# Patient Record
Sex: MALE | Race: WHITE | NOT HISPANIC OR LATINO | Employment: FULL TIME | URBAN - METROPOLITAN AREA
[De-identification: names, ages, dates, MRNs, and addresses within clinical notes are randomized per-mention and may not be internally consistent; named-entity substitution may affect disease eponyms.]

---

## 2024-05-16 ENCOUNTER — APPOINTMENT (OUTPATIENT)
Dept: LAB | Facility: CLINIC | Age: 69
End: 2024-05-16
Payer: MEDICARE

## 2024-05-16 DIAGNOSIS — D72.819 LEUKOPENIA, UNSPECIFIED TYPE: ICD-10-CM

## 2024-05-16 DIAGNOSIS — B35.1 ONYCHOMYCOSIS: ICD-10-CM

## 2024-05-16 LAB
ALBUMIN SERPL BCP-MCNC: 4.4 G/DL (ref 3.5–5)
ALP SERPL-CCNC: 68 U/L (ref 34–104)
ALT SERPL W P-5'-P-CCNC: 16 U/L (ref 7–52)
ANION GAP SERPL CALCULATED.3IONS-SCNC: 7 MMOL/L (ref 4–13)
AST SERPL W P-5'-P-CCNC: 20 U/L (ref 13–39)
BASOPHILS # BLD AUTO: 0.03 THOUSANDS/ÂΜL (ref 0–0.1)
BASOPHILS NFR BLD AUTO: 1 % (ref 0–1)
BILIRUB SERPL-MCNC: 0.53 MG/DL (ref 0.2–1)
BUN SERPL-MCNC: 14 MG/DL (ref 5–25)
CALCIUM SERPL-MCNC: 9.4 MG/DL (ref 8.4–10.2)
CHLORIDE SERPL-SCNC: 108 MMOL/L (ref 96–108)
CO2 SERPL-SCNC: 26 MMOL/L (ref 21–32)
CREAT SERPL-MCNC: 1.08 MG/DL (ref 0.6–1.3)
EOSINOPHIL # BLD AUTO: 0.3 THOUSAND/ÂΜL (ref 0–0.61)
EOSINOPHIL NFR BLD AUTO: 8 % (ref 0–6)
ERYTHROCYTE [DISTWIDTH] IN BLOOD BY AUTOMATED COUNT: 14.7 % (ref 11.6–15.1)
GFR SERPL CREATININE-BSD FRML MDRD: 69 ML/MIN/1.73SQ M
GLUCOSE SERPL-MCNC: 99 MG/DL (ref 65–140)
HCT VFR BLD AUTO: 41.6 % (ref 36.5–49.3)
HGB BLD-MCNC: 12.9 G/DL (ref 12–17)
IMM GRANULOCYTES # BLD AUTO: 0.01 THOUSAND/UL (ref 0–0.2)
IMM GRANULOCYTES NFR BLD AUTO: 0 % (ref 0–2)
LYMPHOCYTES # BLD AUTO: 1 THOUSANDS/ÂΜL (ref 0.6–4.47)
LYMPHOCYTES NFR BLD AUTO: 25 % (ref 14–44)
MCH RBC QN AUTO: 25.1 PG (ref 26.8–34.3)
MCHC RBC AUTO-ENTMCNC: 31 G/DL (ref 31.4–37.4)
MCV RBC AUTO: 81 FL (ref 82–98)
MONOCYTES # BLD AUTO: 0.45 THOUSAND/ÂΜL (ref 0.17–1.22)
MONOCYTES NFR BLD AUTO: 11 % (ref 4–12)
NEUTROPHILS # BLD AUTO: 2.19 THOUSANDS/ÂΜL (ref 1.85–7.62)
NEUTS SEG NFR BLD AUTO: 55 % (ref 43–75)
NRBC BLD AUTO-RTO: 0 /100 WBCS
PLATELET # BLD AUTO: 217 THOUSANDS/UL (ref 149–390)
PMV BLD AUTO: 9 FL (ref 8.9–12.7)
POTASSIUM SERPL-SCNC: 4.3 MMOL/L (ref 3.5–5.3)
PROT SERPL-MCNC: 7.5 G/DL (ref 6.4–8.4)
RBC # BLD AUTO: 5.14 MILLION/UL (ref 3.88–5.62)
SODIUM SERPL-SCNC: 141 MMOL/L (ref 135–147)
WBC # BLD AUTO: 3.98 THOUSAND/UL (ref 4.31–10.16)

## 2024-05-16 PROCEDURE — 85025 COMPLETE CBC W/AUTO DIFF WBC: CPT

## 2024-05-16 PROCEDURE — 36415 COLL VENOUS BLD VENIPUNCTURE: CPT

## 2024-05-16 PROCEDURE — 80053 COMPREHEN METABOLIC PANEL: CPT

## 2024-07-09 ENCOUNTER — PATIENT MESSAGE (OUTPATIENT)
Dept: FAMILY MEDICINE CLINIC | Facility: CLINIC | Age: 69
End: 2024-07-09

## 2024-07-09 DIAGNOSIS — B35.1 ONYCHOMYCOSIS: ICD-10-CM

## 2024-07-11 RX ORDER — TERBINAFINE HYDROCHLORIDE 250 MG/1
250 TABLET ORAL DAILY
Qty: 90 TABLET | Refills: 0 | OUTPATIENT
Start: 2024-07-11

## 2024-08-28 ENCOUNTER — TELEPHONE (OUTPATIENT)
Dept: ADMINISTRATIVE | Facility: OTHER | Age: 69
End: 2024-08-28

## 2024-08-28 ENCOUNTER — RA CDI HCC (OUTPATIENT)
Dept: OTHER | Facility: HOSPITAL | Age: 69
End: 2024-08-28

## 2024-08-30 ENCOUNTER — APPOINTMENT (OUTPATIENT)
Dept: LAB | Facility: CLINIC | Age: 69
End: 2024-08-30
Payer: MEDICARE

## 2024-08-30 DIAGNOSIS — D72.819 LEUKOPENIA, UNSPECIFIED TYPE: ICD-10-CM

## 2024-08-30 DIAGNOSIS — R79.89 ELEVATED TSH: ICD-10-CM

## 2024-08-30 DIAGNOSIS — I77.810 THORACIC AORTIC ECTASIA (HCC): ICD-10-CM

## 2024-08-30 DIAGNOSIS — I10 PRIMARY HYPERTENSION: ICD-10-CM

## 2024-08-30 DIAGNOSIS — R73.09 ABNORMAL BLOOD SUGAR: ICD-10-CM

## 2024-08-30 DIAGNOSIS — R93.1 ELEVATED CORONARY ARTERY CALCIUM SCORE: ICD-10-CM

## 2024-08-30 DIAGNOSIS — I65.23 BILATERAL CAROTID ARTERY STENOSIS: ICD-10-CM

## 2024-08-30 DIAGNOSIS — E55.9 VITAMIN D DEFICIENCY: ICD-10-CM

## 2024-08-30 DIAGNOSIS — R80.1 PERSISTENT PROTEINURIA: ICD-10-CM

## 2024-08-30 LAB
25(OH)D3 SERPL-MCNC: 31.6 NG/ML (ref 30–100)
ALBUMIN SERPL BCG-MCNC: 4.4 G/DL (ref 3.5–5)
ALP SERPL-CCNC: 69 U/L (ref 34–104)
ALT SERPL W P-5'-P-CCNC: 14 U/L (ref 7–52)
ANION GAP SERPL CALCULATED.3IONS-SCNC: 5 MMOL/L (ref 4–13)
AST SERPL W P-5'-P-CCNC: 18 U/L (ref 13–39)
BACTERIA UR QL AUTO: ABNORMAL /HPF
BASOPHILS # BLD AUTO: 0.02 THOUSANDS/ÂΜL (ref 0–0.1)
BASOPHILS NFR BLD AUTO: 1 % (ref 0–1)
BILIRUB SERPL-MCNC: 0.45 MG/DL (ref 0.2–1)
BILIRUB UR QL STRIP: NEGATIVE
BUN SERPL-MCNC: 13 MG/DL (ref 5–25)
CALCIUM SERPL-MCNC: 9.4 MG/DL (ref 8.4–10.2)
CHLORIDE SERPL-SCNC: 109 MMOL/L (ref 96–108)
CHOLEST SERPL-MCNC: 151 MG/DL
CLARITY UR: CLEAR
CO2 SERPL-SCNC: 29 MMOL/L (ref 21–32)
COLOR UR: YELLOW
CREAT SERPL-MCNC: 1.11 MG/DL (ref 0.6–1.3)
EOSINOPHIL # BLD AUTO: 0.36 THOUSAND/ÂΜL (ref 0–0.61)
EOSINOPHIL NFR BLD AUTO: 8 % (ref 0–6)
ERYTHROCYTE [DISTWIDTH] IN BLOOD BY AUTOMATED COUNT: 15.7 % (ref 11.6–15.1)
EST. AVERAGE GLUCOSE BLD GHB EST-MCNC: 117 MG/DL
GFR SERPL CREATININE-BSD FRML MDRD: 66 ML/MIN/1.73SQ M
GLUCOSE P FAST SERPL-MCNC: 102 MG/DL (ref 65–99)
GLUCOSE UR STRIP-MCNC: NEGATIVE MG/DL
HBA1C MFR BLD: 5.7 %
HCT VFR BLD AUTO: 43.4 % (ref 36.5–49.3)
HDLC SERPL-MCNC: 54 MG/DL
HGB BLD-MCNC: 13.9 G/DL (ref 12–17)
HGB UR QL STRIP.AUTO: NEGATIVE
IMM GRANULOCYTES # BLD AUTO: 0.01 THOUSAND/UL (ref 0–0.2)
IMM GRANULOCYTES NFR BLD AUTO: 0 % (ref 0–2)
KETONES UR STRIP-MCNC: NEGATIVE MG/DL
LDLC SERPL CALC-MCNC: 88 MG/DL (ref 0–100)
LEUKOCYTE ESTERASE UR QL STRIP: ABNORMAL
LYMPHOCYTES # BLD AUTO: 1.18 THOUSANDS/ÂΜL (ref 0.6–4.47)
LYMPHOCYTES NFR BLD AUTO: 27 % (ref 14–44)
MCH RBC QN AUTO: 26.1 PG (ref 26.8–34.3)
MCHC RBC AUTO-ENTMCNC: 32 G/DL (ref 31.4–37.4)
MCV RBC AUTO: 82 FL (ref 82–98)
MONOCYTES # BLD AUTO: 0.45 THOUSAND/ÂΜL (ref 0.17–1.22)
MONOCYTES NFR BLD AUTO: 10 % (ref 4–12)
NEUTROPHILS # BLD AUTO: 2.41 THOUSANDS/ÂΜL (ref 1.85–7.62)
NEUTS SEG NFR BLD AUTO: 54 % (ref 43–75)
NITRITE UR QL STRIP: NEGATIVE
NON-SQ EPI CELLS URNS QL MICRO: ABNORMAL /HPF
NRBC BLD AUTO-RTO: 0 /100 WBCS
PH UR STRIP.AUTO: 6.5 [PH]
PLATELET # BLD AUTO: 218 THOUSANDS/UL (ref 149–390)
PMV BLD AUTO: 9.5 FL (ref 8.9–12.7)
POTASSIUM SERPL-SCNC: 4.8 MMOL/L (ref 3.5–5.3)
PROT SERPL-MCNC: 7.2 G/DL (ref 6.4–8.4)
PROT UR STRIP-MCNC: ABNORMAL MG/DL
RBC # BLD AUTO: 5.32 MILLION/UL (ref 3.88–5.62)
RBC #/AREA URNS AUTO: ABNORMAL /HPF
SODIUM SERPL-SCNC: 143 MMOL/L (ref 135–147)
SP GR UR STRIP.AUTO: 1.02 (ref 1–1.03)
T4 FREE SERPL-MCNC: 0.82 NG/DL (ref 0.61–1.12)
TRIGL SERPL-MCNC: 43 MG/DL
TSH SERPL DL<=0.05 MIU/L-ACNC: 6.04 UIU/ML (ref 0.45–4.5)
UROBILINOGEN UR STRIP-ACNC: <2 MG/DL
WBC # BLD AUTO: 4.43 THOUSAND/UL (ref 4.31–10.16)
WBC #/AREA URNS AUTO: ABNORMAL /HPF

## 2024-08-30 PROCEDURE — 81001 URINALYSIS AUTO W/SCOPE: CPT

## 2024-08-30 PROCEDURE — 84439 ASSAY OF FREE THYROXINE: CPT

## 2024-08-30 PROCEDURE — 85025 COMPLETE CBC W/AUTO DIFF WBC: CPT

## 2024-08-30 PROCEDURE — 82306 VITAMIN D 25 HYDROXY: CPT

## 2024-08-30 PROCEDURE — 36415 COLL VENOUS BLD VENIPUNCTURE: CPT

## 2024-08-30 PROCEDURE — 83036 HEMOGLOBIN GLYCOSYLATED A1C: CPT

## 2024-08-30 PROCEDURE — 80053 COMPREHEN METABOLIC PANEL: CPT

## 2024-08-30 PROCEDURE — 80061 LIPID PANEL: CPT

## 2024-08-30 PROCEDURE — 84443 ASSAY THYROID STIM HORMONE: CPT

## 2024-09-04 ENCOUNTER — OFFICE VISIT (OUTPATIENT)
Dept: FAMILY MEDICINE CLINIC | Facility: CLINIC | Age: 69
End: 2024-09-04
Payer: MEDICARE

## 2024-09-04 VITALS
TEMPERATURE: 98.3 F | HEART RATE: 85 BPM | OXYGEN SATURATION: 96 % | DIASTOLIC BLOOD PRESSURE: 94 MMHG | HEIGHT: 69 IN | WEIGHT: 204.6 LBS | BODY MASS INDEX: 30.3 KG/M2 | SYSTOLIC BLOOD PRESSURE: 132 MMHG | RESPIRATION RATE: 16 BRPM

## 2024-09-04 DIAGNOSIS — B35.1 ONYCHOMYCOSIS: ICD-10-CM

## 2024-09-04 DIAGNOSIS — Z80.0 FAMILY HISTORY OF COLON CANCER: ICD-10-CM

## 2024-09-04 DIAGNOSIS — I10 PRIMARY HYPERTENSION: Primary | ICD-10-CM

## 2024-09-04 DIAGNOSIS — R91.1 LUNG NODULE: ICD-10-CM

## 2024-09-04 DIAGNOSIS — E03.8 SUBCLINICAL HYPOTHYROIDISM: ICD-10-CM

## 2024-09-04 DIAGNOSIS — I77.810 THORACIC AORTIC ECTASIA (HCC): ICD-10-CM

## 2024-09-04 PROCEDURE — 99214 OFFICE O/P EST MOD 30 MIN: CPT | Performed by: FAMILY MEDICINE

## 2024-09-04 PROCEDURE — G2211 COMPLEX E/M VISIT ADD ON: HCPCS | Performed by: FAMILY MEDICINE

## 2024-09-04 RX ORDER — MULTIVITAMIN
1 TABLET ORAL DAILY
COMMUNITY

## 2024-09-04 NOTE — PROGRESS NOTES
"  *** refresh    Assessment/Plan:       Assessment & Plan  Primary hypertension         Subclinical hypothyroidism                  Most recent labs reviewed ***      Subjective:     Herbert is a 69 y.o. male here today and has the below chronic conditions:    Patient Active Problem List   Diagnosis    Primary hypertension    Onychomycosis    History of gout    Family history of colon cancer    Abnormal blood sugar    Vitamin D deficiency    Bilateral carotid artery stenosis    Thoracic aortic ectasia (HCC)    Scoliosis of thoracolumbar spine    Elevated coronary artery calcium score    Elevated TSH    Leukopenia    Skin lesion of right lower extremity    Persistent proteinuria    Lung nodule     Current Outpatient Medications   Medication Sig Dispense Refill    allopurinol (ZYLOPRIM) 100 mg tablet TAKE 1 TABLET BY MOUTH EVERY DAY 90 tablet 1    Fish Oil-Cholecalciferol (Omega-3 + D) 500-200 MG-UNIT CAPS Take by mouth Res-q 1250      Multiple Vitamin (multivitamin) tablet Take 1 tablet by mouth daily      ramipril (ALTACE) 5 mg capsule TAKE 1 CAPSULE BY MOUTH EVERY DAY 90 capsule 1    Red Yeast Rice Extract (RED YEAST RICE PO) Take by mouth One daily       No current facility-administered medications for this visit.          HPI:  Chief Complaint   Patient presents with    Follow-up     6 mo f/u. No new problems or concerns at this time.      - CC above per clinical staff and reviewed.    Pt here for     BP at home 123/77, no high readings at home.   No CP or SOB          The following portions of the patient's history were reviewed and updated as appropriate: allergies, current medications, past family history, past medical history, past social history, past surgical history and problem list.    ROS:  Review of Systems   As above    Objective:      /94   Pulse 85   Temp 98.3 °F (36.8 °C)   Resp 16   Ht 5' 9\" (1.753 m)   Wt 92.8 kg (204 lb 9.6 oz)   SpO2 96%   BMI 30.21 kg/m²   BP Readings from Last 3 " Encounters:   09/04/24 132/94   03/28/24 128/72   03/28/24 128/72     Wt Readings from Last 3 Encounters:   09/04/24 92.8 kg (204 lb 9.6 oz)   03/28/24 91.6 kg (202 lb)   03/28/24 91.6 kg (202 lb)               Physical Exam:   Physical Exam

## 2024-09-05 NOTE — ASSESSMENT & PLAN NOTE
He has completed oral terbinafine w resolution of the onychomycosis, though did have side effects of myalgias, fatigue.  He is gradually improving now that he is no longer taking the medication.

## 2024-09-05 NOTE — PROGRESS NOTES
Assessment/Plan:       Assessment & Plan  Primary hypertension  Mild elevation in office, but pt notes his BP is consistently well controlled at home.        Subclinical hypothyroidism  TSH remains in subclinical range   Given age and TSH level- does not require supplemental thyroid hormone but will continue to monitor q 6 months       Family history of colon cancer  Up to date on colonoscopy        Onychomycosis  He has completed oral terbinafine w resolution of the onychomycosis, though did have side effects of myalgias, fatigue. He is gradually improving now that he is no longer taking the medication.        Thoracic aortic ectasia (HCC)  Size remains stable on last imaging  He will have f/u at his next [facility] physical  He also is continuing to follow up with cardiology.       Lung nodule  Reviewed the CT scan from his [facility] physical demonstrating 0.4 cm RLL pulm nodule.  He is low risk aside from age.  He'll get this repeated during his [facility] PE in the spring.  He declines order from me at this point and feels comfortable getting this done when at [facility].  No smoking or vaping hx.                  Subjective:     Herbert is a 69 y.o. male here today and has the below chronic conditions:    Patient Active Problem List   Diagnosis    Primary hypertension    Onychomycosis    History of gout    Family history of colon cancer    Abnormal blood sugar    Vitamin D deficiency    Bilateral carotid artery stenosis    Thoracic aortic ectasia (HCC)    Scoliosis of thoracolumbar spine    Elevated coronary artery calcium score    Leukopenia    Skin lesion of right lower extremity    Persistent proteinuria    Lung nodule    Subclinical hypothyroidism     Current Outpatient Medications   Medication Sig Dispense Refill    allopurinol (ZYLOPRIM) 100 mg tablet TAKE 1 TABLET BY MOUTH EVERY DAY 90 tablet 1    Fish Oil-Cholecalciferol (Omega-3 + D) 500-200 MG-UNIT CAPS Take by mouth Res-q 1250      Multiple  "Vitamin (multivitamin) tablet Take 1 tablet by mouth daily      ramipril (ALTACE) 5 mg capsule TAKE 1 CAPSULE BY MOUTH EVERY DAY 90 capsule 1    Red Yeast Rice Extract (RED YEAST RICE PO) Take by mouth One daily       No current facility-administered medications for this visit.          HPI:  Chief Complaint   Patient presents with    Follow-up     6 mo f/u. No new problems or concerns at this time.      - CC above per clinical staff and reviewed.        The following portions of the patient's history were reviewed and updated as appropriate: allergies, current medications, past family history, past medical history, past social history, past surgical history and problem list.    ROS:  Review of Systems   As above    Objective:      /94   Pulse 85   Temp 98.3 °F (36.8 °C)   Resp 16   Ht 5' 9\" (1.753 m)   Wt 92.8 kg (204 lb 9.6 oz)   SpO2 96%   BMI 30.21 kg/m²   BP Readings from Last 3 Encounters:   09/04/24 132/94   03/28/24 128/72   03/28/24 128/72     Wt Readings from Last 3 Encounters:   09/04/24 92.8 kg (204 lb 9.6 oz)   03/28/24 91.6 kg (202 lb)   03/28/24 91.6 kg (202 lb)               Physical Exam:   Physical Exam  Vitals and nursing note reviewed.   Constitutional:       General: He is not in acute distress.     Appearance: Normal appearance. He is well-developed. He is not ill-appearing.   HENT:      Head: Normocephalic and atraumatic.      Mouth/Throat:      Mouth: Mucous membranes are moist.   Eyes:      Conjunctiva/sclera: Conjunctivae normal.   Neck:      Vascular: No carotid bruit.   Cardiovascular:      Rate and Rhythm: Normal rate and regular rhythm.      Heart sounds: Normal heart sounds. No murmur heard.  Pulmonary:      Effort: Pulmonary effort is normal. No respiratory distress.      Breath sounds: Normal breath sounds. No wheezing.   Abdominal:      Palpations: Abdomen is soft.      Tenderness: There is no abdominal tenderness. There is no guarding or rebound.   Musculoskeletal:     "  Cervical back: Neck supple.      Right lower leg: No edema.      Left lower leg: No edema.   Lymphadenopathy:      Cervical: No cervical adenopathy.   Skin:     General: Skin is warm and dry.   Neurological:      Mental Status: He is alert and oriented to person, place, and time.      Gait: Gait normal.   Psychiatric:         Mood and Affect: Mood normal.         Behavior: Behavior normal.

## 2024-09-05 NOTE — ASSESSMENT & PLAN NOTE
Reviewed the CT scan from his [facility] physical demonstrating 0.4 cm RLL pulm nodule.  He is low risk aside from age.  He'll get this repeated during his [facility] PE in the spring.  He declines order from me at this point and feels comfortable getting this done when at [facility].  No smoking or vaping hx.

## 2024-09-05 NOTE — ASSESSMENT & PLAN NOTE
TSH remains in subclinical range   Given age and TSH level- does not require supplemental thyroid hormone but will continue to monitor q 6 months

## 2024-09-05 NOTE — CMS CERTIFICATION NOTE
Assessment/Plan:       Assessment & Plan  Primary hypertension  Mild elevation in office, but pt notes his BP is consistently well controlled at home.       Subclinical hypothyroidism  TSH remains in subclinical range   Given age and TSH level- does not require supplemental thyroid hormone but will continue to monitor q 6 months         Family history of colon cancer  Up to date on colonoscopy         Onychomycosis  He has completed oral terbinafine w resolution of the onychomycosis, though did have side effects of myalgias, fatigue.  He is gradually improving now that he is no longer taking the medication.           Thoracic aortic ectasia (HCC)  Size remains stable on last imaging  He will have f/u at his next [facility]  He also is continuing to follow up with cardiology.         Lung nodule  Reviewed the CT scan from his [facility] physical demonstrating 0.4 cm RLL pulm nodule.  He is low risk aside from age.  He'll get this repeated during his [facility] PE in the spring.  He declines order from me at this point and feels comfortable getting this done when at [facility].  No smoking or vaping hx.                  Most recent labs reviewed       Subjective:     Herbert is a 69 y.o. male here today and has the below chronic conditions:    Patient Active Problem List   Diagnosis    Primary hypertension    Onychomycosis    History of gout    Family history of colon cancer    Abnormal blood sugar    Vitamin D deficiency    Bilateral carotid artery stenosis    Thoracic aortic ectasia (HCC)    Scoliosis of thoracolumbar spine    Elevated coronary artery calcium score    Leukopenia    Skin lesion of right lower extremity    Persistent proteinuria    Lung nodule    Subclinical hypothyroidism     Current Outpatient Medications   Medication Sig Dispense Refill    allopurinol (ZYLOPRIM) 100 mg tablet TAKE 1 TABLET BY MOUTH EVERY DAY 90 tablet 1    Fish Oil-Cholecalciferol (Omega-3 + D) 500-200 MG-UNIT CAPS  "Take by mouth Res-q 1250      Multiple Vitamin (multivitamin) tablet Take 1 tablet by mouth daily      ramipril (ALTACE) 5 mg capsule TAKE 1 CAPSULE BY MOUTH EVERY DAY 90 capsule 1    Red Yeast Rice Extract (RED YEAST RICE PO) Take by mouth One daily       No current facility-administered medications for this visit.          HPI:  Chief Complaint   Patient presents with    Follow-up     6 mo f/u. No new problems or concerns at this time.      - CC above per clinical staff and reviewed.    Pt here for six month follow up visit.   He is followed [facility].  No concerns today.     Notes that he was on oral antifungal for 12 weeks for onychomycosis.  He had an area on the toe/foot that derm was considering biopsying, but this cleared as fungal changes cleared.   Had side effects while on med including fatigue, lack of exercise endurance, aches.  He notes that he is slowly improving since he completed treatment-  not quite back to baseline yet but improving.    He typically exercises regularly.  His strength/endurance decreased while on antifungal, but he is slowly ramping up his exercise- about at 80% of his usual now.    Not having any CP or SOB with activity.    No changes in his bowel habits.  No blood in stool.  Utd on colonoscopy    He monitors home BP readings. These have been consistently well controlled.    Managing stresses well  Enjoying spending time with 6 month old granddaughter        The following portions of the patient's history were reviewed and updated as appropriate: allergies, current medications, past family history, past medical history, past social history, past surgical history and problem list.    ROS:  Review of Systems   As above    Objective:      /94   Pulse 85   Temp 98.3 °F (36.8 °C)   Resp 16   Ht 5' 9\" (1.753 m)   Wt 92.8 kg (204 lb 9.6 oz)   SpO2 96%   BMI 30.21 kg/m²   BP Readings from Last 3 Encounters:   09/04/24 132/94   03/28/24 128/72   03/28/24 " 128/72     Wt Readings from Last 3 Encounters:   09/04/24 92.8 kg (204 lb 9.6 oz)   03/28/24 91.6 kg (202 lb)   03/28/24 91.6 kg (202 lb)               Physical Exam:   Physical Exam  Vitals and nursing note reviewed.   Constitutional:       General: He is not in acute distress.     Appearance: Normal appearance. He is well-developed. He is not ill-appearing.   HENT:      Head: Normocephalic and atraumatic.   Eyes:      Conjunctiva/sclera: Conjunctivae normal.   Neck:      Vascular: No carotid bruit.   Cardiovascular:      Rate and Rhythm: Normal rate and regular rhythm.      Heart sounds: Normal heart sounds. No murmur heard.  Pulmonary:      Effort: Pulmonary effort is normal. No respiratory distress.      Breath sounds: Normal breath sounds. No wheezing.   Abdominal:      Palpations: Abdomen is soft.      Tenderness: There is no abdominal tenderness. There is no guarding or rebound.   Musculoskeletal:      Cervical back: Neck supple.      Right lower leg: No edema.      Left lower leg: No edema.   Lymphadenopathy:      Cervical: No cervical adenopathy.   Skin:     General: Skin is warm and dry.   Neurological:      Mental Status: He is alert and oriented to person, place, and time.      Gait: Gait normal.   Psychiatric:         Mood and Affect: Mood normal.         Behavior: Behavior normal.

## 2024-09-05 NOTE — ASSESSMENT & PLAN NOTE
Size remains stable on last imaging  He will have f/u at his next [facility]  He also is continuing to follow up with cardiology.

## 2024-09-27 DIAGNOSIS — I10 PRIMARY HYPERTENSION: ICD-10-CM

## 2024-09-27 DIAGNOSIS — Z87.39 HISTORY OF GOUT: ICD-10-CM

## 2024-09-27 RX ORDER — ALLOPURINOL 100 MG/1
TABLET ORAL DAILY
Qty: 90 TABLET | Refills: 1 | Status: SHIPPED | OUTPATIENT
Start: 2024-09-27

## 2024-09-27 RX ORDER — RAMIPRIL 5 MG/1
CAPSULE ORAL DAILY
Qty: 90 CAPSULE | Refills: 1 | Status: SHIPPED | OUTPATIENT
Start: 2024-09-27

## 2024-11-22 ENCOUNTER — PATIENT MESSAGE (OUTPATIENT)
Dept: FAMILY MEDICINE CLINIC | Facility: CLINIC | Age: 69
End: 2024-11-22

## 2024-11-22 DIAGNOSIS — M41.25 OTHER IDIOPATHIC SCOLIOSIS, THORACOLUMBAR REGION: Primary | ICD-10-CM

## 2024-12-13 ENCOUNTER — APPOINTMENT (OUTPATIENT)
Dept: RADIOLOGY | Facility: AMBULARY SURGERY CENTER | Age: 69
End: 2024-12-13
Payer: MEDICARE

## 2024-12-13 VITALS
HEART RATE: 78 BPM | DIASTOLIC BLOOD PRESSURE: 98 MMHG | SYSTOLIC BLOOD PRESSURE: 167 MMHG | BODY MASS INDEX: 30.21 KG/M2 | WEIGHT: 204 LBS | HEIGHT: 69 IN

## 2024-12-13 DIAGNOSIS — M41.25 OTHER IDIOPATHIC SCOLIOSIS, THORACOLUMBAR REGION: ICD-10-CM

## 2024-12-13 DIAGNOSIS — M53.3 SACROILIAC JOINT DYSFUNCTION OF LEFT SIDE: Primary | ICD-10-CM

## 2024-12-13 PROCEDURE — 72080 X-RAY EXAM THORACOLMB 2/> VW: CPT

## 2024-12-13 PROCEDURE — 99204 OFFICE O/P NEW MOD 45 MIN: CPT | Performed by: PHYSICAL MEDICINE & REHABILITATION

## 2024-12-13 NOTE — PROGRESS NOTES
1. Sacroiliac joint dysfunction of left side        2. Other idiopathic scoliosis, thoracolumbar region  Ambulatory Referral to Orthopedic Surgery    XR spine thoracolumbar 2 vw        Orders Placed This Encounter   Procedures    XR spine thoracolumbar 2 vw        Impression:  Lumbar pain that is multifactorial and predominantly due to left sacroiliac joint dysfunction likely from spinal curvature.  There are no concerning neurological deficits.    Patient reports minimal discomfort at time that lasts for a few seconds and resolves.  We discussed that this curvature has likely been present for decades.  We discussed that the best treatment for this is continued physical activity/mobility.  We also discussed that bracing is not a great treatment plan as it can lead to disuse atrophy/worsening of his symptoms.    If Gene's symptoms were to worsen any, I would like to see him back.    No follow-ups on file.    Patient is in agreement with the above plan.      Imaging Studies (I personally reviewed images in PACS and report if it was available):  Lumbar spine x-rays that are most recent to this encounter were reviewed.  These images show a moderate S-shaped curvature of the spine.  There are degenerative changes in the spine and interpretation of disc space is obscured due to the curvature.    HPI:  Herbert Valdivia is a 69 y.o. male  who presents for evaluation of   Chief Complaint   Patient presents with    Lower Back - Pain       Onset/Mechanism: Started 6 months ago without an injury.  Location: Left low back.  Radiation: Denies.  Quality: Painful.  Provocative: Random.  Severity: Not severe.  It is intermittent.  Associated Symptoms: Denies.  Treatment so far: Chiropractic care.    No red flag symptoms including fever/chills, unintentional weight change, bowel/bladder incontinence, scissoring gait, personal/family history of cancer, pain worse at night, intravenous drug abuse or trauma.      Following history  "reviewed and updated:  Past Medical History:   Diagnosis Date    Gout     Hypertension     Kidney stone     Scoliosis April 2022    Chiropractor     Past Surgical History:   Procedure Laterality Date    COLONOSCOPY  06/2015    diverticulosis only    COLONOSCOPY  03/2010    GERARD Caldera MD.- Diverticulosis.    COLONOSCOPY W/ BIOPSIES  12/2020    GERARD Maynard MD.- One 2mm polyp at recto-sigmoid colon; diverticulosis in sigmoid colon and distal descending colon. Bx: Hyperplastic polyp, microvesicular type; negative for dysplasia.    COLONOSCOPY W/ BIOPSIES  06/2004    GERARD Caldera MD.- Small left colon polyp, benign in appearance. Bx: hyperplastic polyp.    ROTATOR CUFF REPAIR Right 2007     Social History   Social History     Substance and Sexual Activity   Alcohol Use Yes    Alcohol/week: 3.0 standard drinks of alcohol    Types: 1 Glasses of wine, 1 Cans of beer, 1 Standard drinks or equivalent per week    Comment: Social     Social History     Substance and Sexual Activity   Drug Use Never     Social History     Tobacco Use   Smoking Status Never    Passive exposure: Past   Smokeless Tobacco Never     Family History   Problem Relation Age of Onset    Colon cancer Mother 78    Heart disease Father         cabg x 4 in his 50s, smoker    No Known Problems Sister     Skin cancer Brother         coretta lawrence age 80s    No Known Problems Daughter      No Known Allergies     Constitutional:  /98   Pulse 78   Ht 5' 9\" (1.753 m)   Wt 92.5 kg (204 lb)   BMI 30.13 kg/m²    General: NAD.   Eyes: Anicteric sclerae.  Neck: Supple.  Lungs: Unlabored breathing.  Cardiovascular: No lower extremity edema.  Skin: Intact without erythema.  Neurologic: Sensation intact to light touch.  Psychiatric: Mood and affect are appropriate.    Orthopedic Examination  Inspection: S-shaped curvature in the spine.  No obvious deformities, lesions or rashes.  ROM: Within normal limits.  Palpation: Mildly tender to palpation at the " left sacroiliac joint. There are no step offs.  Neuro: Bilateral extremity strength is normal and symmetric. No muscle atrophy or abnormal tone.  Bilateral extremity muscle stretch reflexes are physiologic and symmetric .  No myelopathic signs.   Sensation to light touch is intact throughout.  Neural compression testing: Normal bilateral SLR/dural stretch.  Normal Childs's maneuver.    Gait is normal.    Procedures

## 2025-02-25 DIAGNOSIS — Z00.00 ENCOUNTER FOR PREVENTIVE HEALTH EXAMINATION: Primary | ICD-10-CM

## 2025-03-12 ENCOUNTER — PREP FOR PROCEDURE (OUTPATIENT)
Dept: CARDIOLOGY CLINIC | Facility: CLINIC | Age: 70
End: 2025-03-12

## 2025-03-12 ENCOUNTER — OFFICE VISIT (OUTPATIENT)
Dept: FAMILY MEDICINE CLINIC | Facility: CLINIC | Age: 70
End: 2025-03-12

## 2025-03-12 ENCOUNTER — HOSPITAL ENCOUNTER (OUTPATIENT)
Dept: NON INVASIVE DIAGNOSTICS | Facility: CLINIC | Age: 70
Discharge: HOME | End: 2025-03-12

## 2025-03-12 ENCOUNTER — LAB (OUTPATIENT)
Dept: LAB | Facility: CLINIC | Age: 70
End: 2025-03-12

## 2025-03-12 ENCOUNTER — TELEPHONE (OUTPATIENT)
Dept: CARDIOLOGY CLINIC | Facility: CLINIC | Age: 70
End: 2025-03-12

## 2025-03-12 VITALS
WEIGHT: 202 LBS | SYSTOLIC BLOOD PRESSURE: 128 MMHG | HEIGHT: 67 IN | HEART RATE: 62 BPM | BODY MASS INDEX: 31.71 KG/M2 | DIASTOLIC BLOOD PRESSURE: 78 MMHG

## 2025-03-12 VITALS
SYSTOLIC BLOOD PRESSURE: 128 MMHG | DIASTOLIC BLOOD PRESSURE: 78 MMHG | HEIGHT: 67 IN | BODY MASS INDEX: 31.83 KG/M2 | TEMPERATURE: 97.9 F | WEIGHT: 202.8 LBS | HEART RATE: 62 BPM | OXYGEN SATURATION: 97 %

## 2025-03-12 DIAGNOSIS — R94.39 POSITIVE CARDIAC STRESS TEST: ICD-10-CM

## 2025-03-12 DIAGNOSIS — E03.8 SUBCLINICAL HYPOTHYROIDISM: ICD-10-CM

## 2025-03-12 DIAGNOSIS — Z00.00 WELL ADULT EXAM: Primary | ICD-10-CM

## 2025-03-12 DIAGNOSIS — R94.39 ABNORMAL CARDIOVASCULAR STRESS TEST: Primary | ICD-10-CM

## 2025-03-12 DIAGNOSIS — I10 PRIMARY HYPERTENSION: ICD-10-CM

## 2025-03-12 DIAGNOSIS — Z00.00 ENCOUNTER FOR PREVENTIVE HEALTH EXAMINATION: ICD-10-CM

## 2025-03-12 DIAGNOSIS — R93.1 ELEVATED CORONARY ARTERY CALCIUM SCORE: ICD-10-CM

## 2025-03-12 DIAGNOSIS — Z87.39 HISTORY OF GOUT: ICD-10-CM

## 2025-03-12 DIAGNOSIS — R91.1 LUNG NODULE: ICD-10-CM

## 2025-03-12 DIAGNOSIS — I77.810 THORACIC AORTIC ECTASIA (HCC): ICD-10-CM

## 2025-03-12 DIAGNOSIS — R73.03 PREDIABETES: ICD-10-CM

## 2025-03-12 LAB
25(OH)D3 SERPL-MCNC: 31.5 NG/ML (ref 30–100)
ALBUMIN SERPL BCG-MCNC: 4.7 G/DL (ref 3.5–5)
ALP SERPL-CCNC: 77 U/L (ref 34–104)
ALT SERPL W P-5'-P-CCNC: 18 U/L (ref 7–52)
ANION GAP SERPL CALCULATED.3IONS-SCNC: 6 MMOL/L (ref 4–13)
AORTIC ROOT: 3.4 CM
ASCENDING AORTA: 4.1 CM
AST SERPL W P-5'-P-CCNC: 23 U/L (ref 13–39)
ATRIAL RATE: 60 BPM
BACTERIA UR QL AUTO: ABNORMAL /HPF
BASOPHILS # BLD AUTO: 0.04 THOUSANDS/ÂΜL (ref 0–0.1)
BASOPHILS NFR BLD AUTO: 1 % (ref 0–1)
BILIRUB SERPL-MCNC: 0.67 MG/DL (ref 0.2–1)
BILIRUB UR QL STRIP: NEGATIVE
BSA FOR ECHO PROCEDURE: 2.04 M2
BUN SERPL-MCNC: 15 MG/DL (ref 5–25)
CALCIUM SERPL-MCNC: 10 MG/DL (ref 8.4–10.2)
CHEST PAIN STATEMENT: NORMAL
CHLORIDE SERPL-SCNC: 105 MMOL/L (ref 96–108)
CHOLEST SERPL-MCNC: 182 MG/DL (ref ?–200)
CLARITY UR: CLEAR
CO2 SERPL-SCNC: 29 MMOL/L (ref 21–32)
COLOR UR: ABNORMAL
CREAT SERPL-MCNC: 1.14 MG/DL (ref 0.6–1.3)
CRP SERPL HS-MCNC: 1.23 MG/L
E WAVE DECELERATION TIME: 262 MS
E/A RATIO: 0.77
EOSINOPHIL # BLD AUTO: 0.27 THOUSAND/ÂΜL (ref 0–0.61)
EOSINOPHIL NFR BLD AUTO: 6 % (ref 0–6)
ERYTHROCYTE [DISTWIDTH] IN BLOOD BY AUTOMATED COUNT: 16.6 % (ref 11.6–15.1)
EST. AVERAGE GLUCOSE BLD GHB EST-MCNC: 126 MG/DL
FRACTIONAL SHORTENING: 32 (ref 28–44)
GFR SERPL CREATININE-BSD FRML MDRD: 64 ML/MIN/1.73SQ M
GLUCOSE P FAST SERPL-MCNC: 104 MG/DL (ref 65–99)
GLUCOSE UR STRIP-MCNC: NEGATIVE MG/DL
HBA1C MFR BLD: 6 %
HCT VFR BLD AUTO: 45.1 % (ref 36.5–49.3)
HCV AB SER QL: NORMAL
HDLC SERPL-MCNC: 56 MG/DL
HGB BLD-MCNC: 14.1 G/DL (ref 12–17)
HGB UR QL STRIP.AUTO: NEGATIVE
IMM GRANULOCYTES # BLD AUTO: 0.01 THOUSAND/UL (ref 0–0.2)
IMM GRANULOCYTES NFR BLD AUTO: 0 % (ref 0–2)
INTERVENTRICULAR SEPTUM IN DIASTOLE (PARASTERNAL SHORT AXIS VIEW): 1.1 CM
INTERVENTRICULAR SEPTUM: 1.1 CM (ref 0.6–1.1)
KETONES UR STRIP-MCNC: NEGATIVE MG/DL
LAAS-AP2: 20.5 CM2
LAAS-AP4: 14.6 CM2
LDLC SERPL CALC-MCNC: 111 MG/DL (ref 0–100)
LEFT ATRIUM AREA SYSTOLE SINGLE PLANE A4C: 17.5 CM2
LEFT ATRIUM SIZE: 4.5 CM
LEFT ATRIUM VOLUME (MOD BIPLANE): 57 ML
LEFT ATRIUM VOLUME INDEX (MOD BIPLANE): 27.4 ML/M2
LEFT INTERNAL DIMENSION IN SYSTOLE: 3 CM (ref 2.1–4)
LEFT VENTRICULAR INTERNAL DIMENSION IN DIASTOLE: 4.4 CM (ref 3.5–6)
LEFT VENTRICULAR POSTERIOR WALL IN END DIASTOLE: 1.1 CM
LEFT VENTRICULAR STROKE VOLUME: 53 ML
LEUKOCYTE ESTERASE UR QL STRIP: NEGATIVE
LV EF US.2D.A4C+ESTIMATED: 65 %
LVSV (TEICH): 53 ML
LYMPHOCYTES # BLD AUTO: 1.19 THOUSANDS/ÂΜL (ref 0.6–4.47)
LYMPHOCYTES NFR BLD AUTO: 27 % (ref 14–44)
MAX DIASTOLIC BP: 98 MMHG
MAX HR PERCENT: 86 %
MAX HR: 129 BPM
MAX PREDICTED HEART RATE: 149 BPM
MCH RBC QN AUTO: 24.9 PG (ref 26.8–34.3)
MCHC RBC AUTO-ENTMCNC: 31.3 G/DL (ref 31.4–37.4)
MCV RBC AUTO: 80 FL (ref 82–98)
MONOCYTES # BLD AUTO: 0.47 THOUSAND/ÂΜL (ref 0.17–1.22)
MONOCYTES NFR BLD AUTO: 11 % (ref 4–12)
MV E'TISSUE VEL-SEP: 9 CM/S
MV PEAK A VEL: 0.82 M/S
MV PEAK E VEL: 63 CM/S
MV STENOSIS PRESSURE HALF TIME: 76 MS
MV VALVE AREA P 1/2 METHOD: 2.9
NEUTROPHILS # BLD AUTO: 2.47 THOUSANDS/ÂΜL (ref 1.85–7.62)
NEUTS SEG NFR BLD AUTO: 55 % (ref 43–75)
NITRITE UR QL STRIP: NEGATIVE
NON-SQ EPI CELLS URNS QL MICRO: ABNORMAL /HPF
NRBC BLD AUTO-RTO: 0 /100 WBCS
P AXIS: 63 DEGREES
PH UR STRIP.AUTO: 5.5 [PH]
PLATELET # BLD AUTO: 228 THOUSANDS/UL (ref 149–390)
PMV BLD AUTO: 9.3 FL (ref 8.9–12.7)
POST LVEF: 70 %
POTASSIUM SERPL-SCNC: 4 MMOL/L (ref 3.5–5.3)
PR INTERVAL: 248 MS
PROT SERPL-MCNC: 7.6 G/DL (ref 6.4–8.4)
PROT UR STRIP-MCNC: ABNORMAL MG/DL
PROTOCOL NAME: NORMAL
PSA SERPL-MCNC: 1.38 NG/ML (ref 0–4)
QRS AXIS: 48 DEGREES
QRSD INTERVAL: 92 MS
QT INTERVAL: 410 MS
QTC INTERVAL: 410 MS
RATE PRESSURE PRODUCT: NORMAL
RBC # BLD AUTO: 5.66 MILLION/UL (ref 3.88–5.62)
RBC #/AREA URNS AUTO: ABNORMAL /HPF
REASON FOR TERMINATION: NORMAL
RIGHT ATRIAL 2D VOLUME: 24 ML
RIGHT ATRIUM AREA SYSTOLE A4C: 13.8 CM2
RIGHT VENTRICLE ID DIMENSION: 3.8 CM
SL CV LEFT ATRIUM LENGTH A2C: 5.3 CM
SL CV LV EF: 60
SL CV LV EF: 60
SL CV PED ECHO LEFT VENTRICLE DIASTOLIC VOLUME (MOD BIPLANE) 2D: 88 ML
SL CV PED ECHO LEFT VENTRICLE SYSTOLIC VOLUME (MOD BIPLANE) 2D: 35 ML
SL CV STRESS RECOVERY BP: NORMAL MMHG
SL CV STRESS RECOVERY HR: 78 BPM
SL CV STRESS RECOVERY O2 SAT: 94 %
SL CV STRESS STAGE REACHED: 3
SODIUM SERPL-SCNC: 140 MMOL/L (ref 135–147)
SP GR UR STRIP.AUTO: 1.02 (ref 1–1.03)
STRESS ANGINA INDEX: 0
STRESS BASELINE BP: NORMAL MMHG
STRESS BASELINE HR: 61 BPM
STRESS DUKE TREADMILL SCORE: -1
STRESS O2 SAT REST: 95 %
STRESS PEAK HR: 127 BPM
STRESS POST ESTIMATED WORKLOAD: 10.1 METS
STRESS POST EXERCISE DUR MIN: 9 MIN
STRESS POST EXERCISE DUR MIN: 9 MIN
STRESS POST EXERCISE DUR SEC: 0 SEC
STRESS POST EXERCISE DUR SEC: 0 SEC
STRESS POST O2 SAT PEAK: 94 %
STRESS POST PEAK BP: 178 MMHG
STRESS POST PEAK HR: 129 BPM
STRESS POST PEAK SYSTOLIC BP: 180 MMHG
STRESS ST DEPRESSION: 2 MM
T WAVE AXIS: 18 DEGREES
T4 FREE SERPL-MCNC: 0.81 NG/DL (ref 0.61–1.12)
TARGET HR FORMULA: NORMAL
TEST INDICATION: NORMAL
TR MAX PG: 11 MMHG
TR PEAK VELOCITY: 1.7 M/S
TRANSVERSE AORTIC ARCH: 3.93 CM
TRICUSPID ANNULAR PLANE SYSTOLIC EXCURSION: 2.4 CM
TRICUSPID VALVE PEAK REGURGITATION VELOCITY: 1.68 M/S
TRIGL SERPL-MCNC: 73 MG/DL (ref ?–150)
TSH SERPL DL<=0.05 MIU/L-ACNC: 7.29 UIU/ML (ref 0.45–4.5)
UROBILINOGEN UR STRIP-ACNC: <2 MG/DL
VENTRICULAR RATE: 60 BPM
WBC # BLD AUTO: 4.45 THOUSAND/UL (ref 4.31–10.16)
WBC #/AREA URNS AUTO: ABNORMAL /HPF

## 2025-03-12 PROCEDURE — 93306 TTE W/DOPPLER COMPLETE: CPT

## 2025-03-12 PROCEDURE — 84153 ASSAY OF PSA TOTAL: CPT

## 2025-03-12 PROCEDURE — 85025 COMPLETE CBC W/AUTO DIFF WBC: CPT

## 2025-03-12 PROCEDURE — 84439 ASSAY OF FREE THYROXINE: CPT

## 2025-03-12 PROCEDURE — 93306 TTE W/DOPPLER COMPLETE: CPT | Performed by: INTERNAL MEDICINE

## 2025-03-12 PROCEDURE — 36415 COLL VENOUS BLD VENIPUNCTURE: CPT

## 2025-03-12 PROCEDURE — 81001 URINALYSIS AUTO W/SCOPE: CPT

## 2025-03-12 PROCEDURE — 86141 C-REACTIVE PROTEIN HS: CPT

## 2025-03-12 PROCEDURE — 80053 COMPREHEN METABOLIC PANEL: CPT

## 2025-03-12 PROCEDURE — 80061 LIPID PANEL: CPT

## 2025-03-12 PROCEDURE — 86803 HEPATITIS C AB TEST: CPT

## 2025-03-12 PROCEDURE — 93010 ELECTROCARDIOGRAM REPORT: CPT | Performed by: INTERNAL MEDICINE

## 2025-03-12 PROCEDURE — 99499EX: Performed by: INTERNAL MEDICINE

## 2025-03-12 PROCEDURE — 82306 VITAMIN D 25 HYDROXY: CPT

## 2025-03-12 PROCEDURE — 83695 ASSAY OF LIPOPROTEIN(A): CPT

## 2025-03-12 PROCEDURE — 93350 STRESS TTE ONLY: CPT

## 2025-03-12 PROCEDURE — 93350 STRESS TTE ONLY: CPT | Performed by: INTERNAL MEDICINE

## 2025-03-12 PROCEDURE — 93005 ELECTROCARDIOGRAM TRACING: CPT

## 2025-03-12 PROCEDURE — 84443 ASSAY THYROID STIM HORMONE: CPT

## 2025-03-12 PROCEDURE — 83036 HEMOGLOBIN GLYCOSYLATED A1C: CPT

## 2025-03-12 RX ORDER — ATORVASTATIN CALCIUM 40 MG/1
40 TABLET, FILM COATED ORAL DAILY
Qty: 90 TABLET | Refills: 0 | Status: SHIPPED | OUTPATIENT
Start: 2025-03-12

## 2025-03-12 RX ORDER — ASPIRIN 81 MG/1
81 TABLET ORAL DAILY
Qty: 90 TABLET | Refills: 0 | Status: SHIPPED | OUTPATIENT
Start: 2025-03-12

## 2025-03-12 NOTE — ASSESSMENT & PLAN NOTE
Last CT scan performed on 3/28/2024 revealed a stable ectatic ascending thoracic aorta measuring 4.2 cm.  He was also noted to have a 4 mm right lower lobe pulmonary nodule at the time.  Will order a follow-up CT scan for evaluation.  Orders:  •  CT chest wo contrast; Future

## 2025-03-12 NOTE — TELEPHONE ENCOUNTER
----- Message from Talon Guevara MD sent at 3/12/2025  1:16 PM EDT -----  Regarding: RE: [facility]  Sure, yes!  ----- Message -----  From: Abbey Ayala MA  Sent: 3/12/2025   1:02 PM EDT  To: Talon Guevara MD; #  Subject: RE: [facility] Dr Mike Weiss is booked out for the week, But Dr Louis is tomorrow in [place]. Can be this an option??  ----- Message -----  From: Talon Guevara MD  Sent: 3/12/2025  12:54 PM EDT  To: Cardiology Surgery Coordinator  Subject: [facility]                                              This is an [facility] patient.  Please have him scheduled for a [facility] ASAP for abnormal stress test.  I'll do the prep for procedure.  Thanks.  Please confirm once this has been arranged.

## 2025-03-12 NOTE — ASSESSMENT & PLAN NOTE
See plan for positive stress test above.  Orders:  •  aspirin (ECOTRIN LOW STRENGTH) 81 mg EC tablet; Take 1 tablet (81 mg total) by mouth daily  •  atorvastatin (LIPITOR) 40 mg tablet; Take 1 tablet (40 mg total) by mouth daily

## 2025-03-12 NOTE — ASSESSMENT & PLAN NOTE
You have a small 4 mm right lower lobe pulmonary nodule that was seen on your last CT scan.  A CAT scan has been ordered for evaluation of your thoracic aortic ectasia.  Your nodule can be reassessed at that time as well.  Orders:  •  CT chest wo contrast; Future

## 2025-03-12 NOTE — PROGRESS NOTES
Fitness Summary and Recommendations: Herbert scored at the 25% on his body composition assessment with a bodyfat % of 27.9%. Herbert scored in the above average range for flexibility with a sit & reach score of 22 cm. His Muscle Strength/Endurance scores placed him at the 95% (lower body) and 95% (upper body) with a chair stand score of 29 and arm curl score of 34 respectively. Herbert Cardiovascular Score of 35.6 placed him at the 70%. Overall, Herbert would be considered to have an above average fitness level with an 71% score. His overall fitness score has increased by 0% from his previous test on 04/05/22. Continued emphasis on regular exercise and sound nutrition practices will enable Herbert to reach his optimal level of fitness.

## 2025-03-12 NOTE — PATIENT INSTRUCTIONS
Gene it was a pleasure to host you today for your [facility] physical. I recommend returning in 1 year for a Full Day physical. If you have any questions regarding today's exam please feel free to reach out to me. We look forward to seeing you again!     Peter Jaimes MD  [phone]  [email or web address]

## 2025-03-12 NOTE — TELEPHONE ENCOUNTER
Patient scheduled for   LHC at [place] on 03/19/2025  with Dr Valentine.  Patient aware of general instructions, labs test required.   No Meds holds

## 2025-03-12 NOTE — PROGRESS NOTES
"[facility] Physical Exam Half Day      Herbert Valdivia is a 70 y.o. male who is presenting for an [facility] Exam at [facility].     Gene has a past medical history notable for thoracic aortic ectasia, lung nodule, subclinical hypothyroidism, hypertension and an elevated coronary artery calcium score.    Overall he feels he has been doing well from a health standpoint.  His main goal is to lose roughly 10 to 15 pounds.  Since his last visit he states the fungal infection involving his toenails has cleared with his course of Lamisil.  He notes while on the Lamisil that he felt somewhat fatigued and lost his stamina.  He states since stopping the medication he was returned back to his normal energy levels.    Dmitriy states he is currently working on his diet.  He reports when he is at home he eats \"clean.\"  He is fairly active and lifts weights 3 times a week in addition to going running 3 times a week.  He also reports that he donates blood roughly every 8 weeks.    Review of Systems   Constitutional:  Negative for chills and fever.   HENT:  Negative for ear pain and sore throat.    Eyes:  Negative for pain and visual disturbance.   Respiratory:  Negative for cough and shortness of breath.    Cardiovascular:  Negative for chest pain and palpitations.   Gastrointestinal:  Negative for abdominal pain and vomiting.   Genitourinary:  Negative for dysuria and hematuria.   Musculoskeletal:  Negative for arthralgias and back pain.   Skin:  Negative for color change and rash.   Neurological:  Negative for seizures and syncope.   All other systems reviewed and are negative.        Active Ambulatory Problems     Diagnosis Date Noted   • Primary hypertension 04/05/2022   • Onychomycosis 04/05/2022   • History of gout 04/05/2022   • Family history of colon cancer 04/05/2022   • Abnormal blood sugar 04/05/2022   • Vitamin D deficiency 04/05/2022   • Bilateral carotid artery " stenosis 04/05/2022   • Thoracic aortic ectasia (HCC) 04/05/2022   • Scoliosis of thoracolumbar spine 03/28/2024   • Elevated coronary artery calcium score 03/28/2024   • Leukopenia 03/28/2024   • Skin lesion of right lower extremity 03/28/2024   • Persistent proteinuria 03/28/2024   • Lung nodule 03/28/2024   • Subclinical hypothyroidism 03/28/2024   • Sacroiliac joint dysfunction of left side 12/13/2024   • Prediabetes 03/12/2025     Resolved Ambulatory Problems     Diagnosis Date Noted   • Well adult exam 04/05/2022   • Tinea pedis of both feet 04/05/2022   • Dysfunction of both eustachian tubes 04/05/2022   • Hydronephrosis 04/05/2022     Past Medical History:   Diagnosis Date   • Gout    • Hypertension    • Kidney stone    • Scoliosis April 2022       Past Surgical History:   Procedure Laterality Date   • COLONOSCOPY  06/2015    diverticulosis only   • COLONOSCOPY  03/2010    GERARD Caldera MD.- Diverticulosis.   • COLONOSCOPY W/ BIOPSIES  12/2020    GERARD Maynard MD.- One 2mm polyp at recto-sigmoid colon; diverticulosis in sigmoid colon and distal descending colon. Bx: Hyperplastic polyp, microvesicular type; negative for dysplasia.   • COLONOSCOPY W/ BIOPSIES  06/2004    GERARD Caldera MD.- Small left colon polyp, benign in appearance. Bx: hyperplastic polyp.   • ROTATOR CUFF REPAIR Right 2007       Family History   Problem Relation Age of Onset   • Colon cancer Mother 78   • Heart disease Father         cabg x 4 in his 50s, smoker   • No Known Problems Sister    • Skin cancer Brother         coretta lawrence age 80s   • No Known Problems Daughter        Social History     Tobacco Use   Smoking Status Never   • Passive exposure: Past   Smokeless Tobacco Never         Current Outpatient Medications:   •  aspirin (ECOTRIN LOW STRENGTH) 81 mg EC tablet, Take 1 tablet (81 mg total) by mouth daily, Disp: 90 tablet, Rfl: 0  •  atorvastatin (LIPITOR) 40 mg tablet, Take 1 tablet (40 mg total) by mouth daily, Disp:  "90 tablet, Rfl: 0  •  allopurinol (ZYLOPRIM) 100 mg tablet, TAKE 1 TABLET BY MOUTH EVERY DAY, Disp: 90 tablet, Rfl: 1  •  Fish Oil-Cholecalciferol (Omega-3 + D) 500-200 MG-UNIT CAPS, Take by mouth Res-q 1250, Disp: , Rfl:   •  Multiple Vitamin (multivitamin) tablet, Take 1 tablet by mouth daily, Disp: , Rfl:   •  ramipril (ALTACE) 5 mg capsule, TAKE 1 CAPSULE BY MOUTH EVERY DAY, Disp: 90 capsule, Rfl: 1    No Known Allergies    PHQ-2/9 Depression Screening    Little interest or pleasure in doing things: 0 - not at all  Feeling down, depressed, or hopeless: 0 - not at all  PHQ-2 Score: 0  PHQ-2 Interpretation: Negative depression screen         Objective:    Vision Screening    Right eye Left eye Both eyes   Without correction 20/13 20/15 20/13   With correction             Physical Exam  Vitals and nursing note reviewed.   Constitutional:       General: He is not in acute distress.     Appearance: He is well-developed.   HENT:      Head: Normocephalic and atraumatic.   Eyes:      Conjunctiva/sclera: Conjunctivae normal.   Cardiovascular:      Rate and Rhythm: Normal rate and regular rhythm.      Heart sounds: No murmur heard.  Pulmonary:      Effort: Pulmonary effort is normal. No respiratory distress.      Breath sounds: Normal breath sounds.   Abdominal:      Palpations: Abdomen is soft.      Tenderness: There is no abdominal tenderness.   Musculoskeletal:         General: No swelling.      Cervical back: Neck supple.   Skin:     General: Skin is warm and dry.      Capillary Refill: Capillary refill takes less than 2 seconds.   Neurological:      Mental Status: He is alert.   Psychiatric:         Mood and Affect: Mood normal.           Vitals:    03/12/25 1042   BP: 128/78   Pulse: 62   Temp: 97.9 °F (36.6 °C)   SpO2: 97%   Weight: 92 kg (202 lb 12.8 oz)   Height: 5' 7.25\" (1.708 m)       Assessment/Plan:    Assessment & Plan  Well adult exam  Overall you appear to be in good health.  Your last colonoscopy was " performed in 2024.  Your gastroenterologist has recommended a 5-year surveillance colonoscopy.       Positive cardiac stress test  Your cardiac stress test is positive.  During peak exercise there were some wall motion abnormality seen at the bottom of your heart which is concerning for ischemia.  Cardiology has recommended arranging a cardiac catheterization to assess for significant narrowing involving your coronary arteries.  For primary prevention of heart attack a prescription for aspirin 81 mg daily in addition to atorvastatin 40 mg daily have been sent to your pharmacy.  Orders:  •  aspirin (ECOTRIN LOW STRENGTH) 81 mg EC tablet; Take 1 tablet (81 mg total) by mouth daily  •  atorvastatin (LIPITOR) 40 mg tablet; Take 1 tablet (40 mg total) by mouth daily    Elevated coronary artery calcium score  See plan for positive stress test above.  Orders:  •  aspirin (ECOTRIN LOW STRENGTH) 81 mg EC tablet; Take 1 tablet (81 mg total) by mouth daily  •  atorvastatin (LIPITOR) 40 mg tablet; Take 1 tablet (40 mg total) by mouth daily    Thoracic aortic ectasia (HCC)  Last CT scan performed on 3/28/2024 revealed a stable ectatic ascending thoracic aorta measuring 4.2 cm.  He was also noted to have a 4 mm right lower lobe pulmonary nodule at the time.  Will order a follow-up CT scan for evaluation.  Orders:  •  CT chest wo contrast; Future    Subclinical hypothyroidism  Your thyroid function test is elevated however it still remains in the subclinical range.  Recommend repeating your thyroid function testing in 6 months.  This can be ordered by your PCP.       Prediabetes  Your hemoglobin A1c has increased to 6.0 from 5.7 previously.  You are within the range of prediabetes.  Recommend considering nutrition counseling.  In addition to this try to incorporate an animal/plant based protein with each meal.  Consider a trial of a low carbohydrate diet which would consist of consuming 100-150g of carbohydrate daily.        Lung  nodule  You have a small 4 mm right lower lobe pulmonary nodule that was seen on your last CT scan.  A CAT scan has been ordered for evaluation of your thoracic aortic ectasia.  Your nodule can be reassessed at that time as well.  Orders:  •  CT chest wo contrast; Future    Primary hypertension  Your blood pressure is well controlled. Continue your current dose of Ramipril as prescribed.        History of gout  Your gout is well controlled. Continue Allopurinol as prescribed.

## 2025-03-12 NOTE — PROGRESS NOTES
"  Your [facility] Board-Certified Dermatologist's Name: JACKY Schilling    Skin Screening Exam:    A chaperone was present throughout the entire encounter.      SKIN:  FULL ORGAN SYSTEM EXAM   Hair, Scalp, Ears, Face Normal except as noted below in Assessment   Neck, Cervical Chain Nodes Normal except as noted below in Assessment   Right Arm/Hand/Fingers Normal except as noted below in Assessment   Left Arm/Hand/Fingers Normal except as noted below in Assessment   Chest/Breasts/Axillae Did the patient specifically refuse to have the areas \"under-the-bra\" examined by the Dermatologist? N/a  Examined areas normal except as noted below in Assessment   Abdomen, Umbilicus Normal except as noted below in Assessment   Back/Spine Normal except as noted below in Assessment   Groin/Genitalia/Buttocks Did the patient specifically refuse to have the areas \"under-the-underwear\" examined by the Dermatologist? No, did not examine groin  Examined areas normal except as noted below in Assessment   Right Leg, Foot, Toes Normal except as noted below in Assessment   Left Leg, Foot, Toes Normal except as noted below in Assessment        Assessment and Plan by Diagnosis:    Use a moisturizer + sunscreen \"combo\" product such as Neutrogena Daily Defense SPF 50+ or CeraVe AM at least three times a day.  Follow-up with your private dermatologist or one of our [facility] Dermatologists or Advanced Practitioners as discussed.  [facility] Dermatology's own Dr. Quynh Benson is available for consultation for skin enhancement and revitalization services; please mention \"[facility]\" for expedited scheduling  [facility], several small patches were apparent on the right lower leg, which was suspicious for tinea. Patient was placed on oral lamisil for treatment of onycomyocsis of the toenails, hoping the rash would also clear, which it did. Toenails are also appearing well and growing appropriately.    Right " forehead appears to have an actinic keratosis; patient encouraged to make f/u appt to treat lesion with liquid nitrogen; he has been educated on these types of lesions and that they have a less than 1% chance of developing into a skin cancer; being that this lesion is very small and superficial, he is encouraged to monitor if he opts not to make f/u appt for liquid nitrogen; otherwise he appears healthy and no other concerns. He should continue annual skin exams and continue to practice safe sun protection protocols.

## 2025-03-12 NOTE — ASSESSMENT & PLAN NOTE
Your thyroid function test is elevated however it still remains in the subclinical range.  Recommend repeating your thyroid function testing in 6 months.  This can be ordered by your PCP.

## 2025-03-12 NOTE — PROGRESS NOTES
Heart and Vascular Summary:    Baseline ECG:  Normal sinus rhythm with 1st degree AV block, premature ventricular complex, otherwise normal ECG.  There is a slight delay in the conduction of electrical activity coming from the top part of your heart to the bottom.  This can happen in active individuals, as well we with aging.  Usually, this is of no concern.  You also had a premature ventricular complex, which is an isolated early/extra beat coming from the bottom part of your heart.  They are relatively benign in isolation, and as long as you are not having significant symptoms of frequent palpitations/heart racing that are bothersome to you, nothing further needs to be done about this either.  There were no changes in this ECG compared to your prior study.       Echocardiogram: Normal right and left ventricular size and function.  Normal valvular structure and function other than some mild age-related changes.  Your ascending aorta was visualized on this study and it measured the same as prior years.      Stress Echocardiogram: Good exercise capacity (9 mins) on the Sb protocol, achieving 10.1 METS, and 86% of maximal predicted heart rate.  You had some changes on the ECG portion to suggest ischemia or a functionally significant blockage.  This was different compared to your stress test last year.  Blood pressure was normal at the start of the test, with a normal response to exercise (peak blood pressure of 180 systolic).  You had a good heart rate recovery after exercise.  Normal baseline left and right ventricular wall motion and function on echocardiogram with a very mild wall motion abnormality seen at peak stress in the basal portion of the inferior wall that suggests a possible significant blockage in your coronary arteries over 50% that may require revascularization.   Considering that your coronary calcium score was also elevated in 2022, I would recommend that you proceed with a cardiac catheterization  for further evaluation and management.        Lipid Profile: this revealed a total cholesterol of 182, LDL of 111, HDL of 56, and a Triglyceride level of 73. The total cholesterol is a sum of its individual parts.  The HDL is the good cholesterol, which is protective to your heart.  Your level is above your goal of over 40.  The only way to raise this level is with increased activity/exercise.  The Triglycerides are a marker of your diet and genetics of cholesterol metabolism.  Less than 150 is what we aim for, and your level is well controlled.  Reducing the simple sugars and carbohydrates in your diet will help reduce this value.  The LDL is the bad cholesterol, the cholesterol that builds atherosclerotic plaque in your arteries.  Your level is higher than your goal of less than 100, and is higher than your last evaluation.  Reducing the trans-fats and saturated fats in your diet along with increasing exercise can help improve this value.  Considering that your coronary calcium score did also come back as elevated in 2022, and your stress test showed some abnormal findings today, you would benefit from starting a statin medication.  We will provide the results of the Lipoprotein(a) test, which is a deeper evaluation of the genetics of cholesterol metabolism and cardiac risk.

## 2025-03-12 NOTE — ASSESSMENT & PLAN NOTE
Your hemoglobin A1c has increased to 6.0 from 5.7 previously.  You are within the range of prediabetes.  Recommend considering nutrition counseling.  In addition to this try to incorporate an animal/plant based protein with each meal.  Consider a trial of a low carbohydrate diet which would consist of consuming 100-150g of carbohydrate daily.

## 2025-03-13 LAB — LPA SERPL-SCNC: 9.8 NMOL/L

## 2025-03-14 ENCOUNTER — RESULTS FOLLOW-UP (OUTPATIENT)
Dept: FAMILY MEDICINE CLINIC | Facility: CLINIC | Age: 70
End: 2025-03-14

## 2025-03-17 ENCOUNTER — PATIENT MESSAGE (OUTPATIENT)
Dept: FAMILY MEDICINE CLINIC | Facility: CLINIC | Age: 70
End: 2025-03-17

## 2025-03-18 DIAGNOSIS — I10 PRIMARY HYPERTENSION: Primary | ICD-10-CM

## 2025-03-18 RX ORDER — RAMIPRIL 10 MG/1
10 CAPSULE ORAL DAILY
Qty: 90 CAPSULE | Refills: 1 | Status: SHIPPED | OUTPATIENT
Start: 2025-03-18

## 2025-03-19 ENCOUNTER — HOSPITAL ENCOUNTER (OUTPATIENT)
Facility: HOSPITAL | Age: 70
Setting detail: OUTPATIENT SURGERY
Discharge: HOME | End: 2025-03-19
Attending: INTERNAL MEDICINE | Admitting: INTERNAL MEDICINE
Payer: MEDICARE

## 2025-03-19 VITALS
HEART RATE: 70 BPM | OXYGEN SATURATION: 96 % | DIASTOLIC BLOOD PRESSURE: 73 MMHG | TEMPERATURE: 55.4 F | SYSTOLIC BLOOD PRESSURE: 141 MMHG | WEIGHT: 192 LBS | BODY MASS INDEX: 29.85 KG/M2 | RESPIRATION RATE: 16 BRPM

## 2025-03-19 DIAGNOSIS — I25.10 CORONARY ARTERY DISEASE INVOLVING NATIVE CORONARY ARTERY: Primary | ICD-10-CM

## 2025-03-19 DIAGNOSIS — R94.39 ABNORMAL CARDIOVASCULAR STRESS TEST: ICD-10-CM

## 2025-03-19 LAB
ATRIAL RATE: 83 BPM
P AXIS: 57 DEGREES
PR INTERVAL: 278 MS
QRS AXIS: -12 DEGREES
QRSD INTERVAL: 92 MS
QT INTERVAL: 360 MS
QTC INTERVAL: 413 MS
T WAVE AXIS: 40 DEGREES
VENTRICULAR RATE: 79 BPM

## 2025-03-19 PROCEDURE — 99152 MOD SED SAME PHYS/QHP 5/>YRS: CPT | Performed by: INTERNAL MEDICINE

## 2025-03-19 PROCEDURE — 99153 MOD SED SAME PHYS/QHP EA: CPT | Performed by: INTERNAL MEDICINE

## 2025-03-19 PROCEDURE — 93005 ELECTROCARDIOGRAM TRACING: CPT

## 2025-03-19 PROCEDURE — 93010 ELECTROCARDIOGRAM REPORT: CPT | Performed by: INTERNAL MEDICINE

## 2025-03-19 PROCEDURE — C1894 INTRO/SHEATH, NON-LASER: HCPCS | Performed by: INTERNAL MEDICINE

## 2025-03-19 PROCEDURE — 93454 CORONARY ARTERY ANGIO S&I: CPT | Performed by: INTERNAL MEDICINE

## 2025-03-19 PROCEDURE — C1769 GUIDE WIRE: HCPCS | Performed by: INTERNAL MEDICINE

## 2025-03-19 RX ORDER — SODIUM CHLORIDE 9 MG/ML
125 INJECTION, SOLUTION INTRAVENOUS CONTINUOUS
Status: DISCONTINUED | OUTPATIENT
Start: 2025-03-19 | End: 2025-03-19

## 2025-03-19 RX ORDER — ACETAMINOPHEN 325 MG/1
975 TABLET ORAL ONCE AS NEEDED
Status: DISCONTINUED | OUTPATIENT
Start: 2025-03-19 | End: 2025-03-19 | Stop reason: HOSPADM

## 2025-03-19 RX ORDER — LIDOCAINE HYDROCHLORIDE 10 MG/ML
INJECTION, SOLUTION EPIDURAL; INFILTRATION; INTRACAUDAL; PERINEURAL CODE/TRAUMA/SEDATION MEDICATION
Status: DISCONTINUED | OUTPATIENT
Start: 2025-03-19 | End: 2025-03-19 | Stop reason: HOSPADM

## 2025-03-19 RX ORDER — MIDAZOLAM HYDROCHLORIDE 2 MG/2ML
INJECTION, SOLUTION INTRAMUSCULAR; INTRAVENOUS CODE/TRAUMA/SEDATION MEDICATION
Status: DISCONTINUED | OUTPATIENT
Start: 2025-03-19 | End: 2025-03-19 | Stop reason: HOSPADM

## 2025-03-19 RX ORDER — FENTANYL CITRATE 50 UG/ML
INJECTION, SOLUTION INTRAMUSCULAR; INTRAVENOUS CODE/TRAUMA/SEDATION MEDICATION
Status: DISCONTINUED | OUTPATIENT
Start: 2025-03-19 | End: 2025-03-19 | Stop reason: HOSPADM

## 2025-03-19 RX ORDER — METOPROLOL SUCCINATE 25 MG/1
25 TABLET, EXTENDED RELEASE ORAL DAILY
Qty: 90 TABLET | Refills: 3 | Status: SHIPPED | OUTPATIENT
Start: 2025-03-19 | End: 2025-03-19

## 2025-03-19 RX ORDER — HEPARIN SODIUM 1000 [USP'U]/ML
INJECTION, SOLUTION INTRAVENOUS; SUBCUTANEOUS CODE/TRAUMA/SEDATION MEDICATION
Status: DISCONTINUED | OUTPATIENT
Start: 2025-03-19 | End: 2025-03-19 | Stop reason: HOSPADM

## 2025-03-19 RX ORDER — VERAPAMIL HCL 2.5 MG/ML
AMPUL (ML) INTRAVENOUS CODE/TRAUMA/SEDATION MEDICATION
Status: DISCONTINUED | OUTPATIENT
Start: 2025-03-19 | End: 2025-03-19 | Stop reason: HOSPADM

## 2025-03-19 RX ORDER — ASPIRIN 81 MG/1
324 TABLET, CHEWABLE ORAL ONCE
Status: COMPLETED | OUTPATIENT
Start: 2025-03-19 | End: 2025-03-19

## 2025-03-19 RX ORDER — CLOPIDOGREL 300 MG/1
600 TABLET, FILM COATED ORAL ONCE
Status: COMPLETED | OUTPATIENT
Start: 2025-03-19 | End: 2025-03-19

## 2025-03-19 RX ORDER — NITROGLYCERIN 20 MG/100ML
INJECTION INTRAVENOUS CODE/TRAUMA/SEDATION MEDICATION
Status: DISCONTINUED | OUTPATIENT
Start: 2025-03-19 | End: 2025-03-19 | Stop reason: HOSPADM

## 2025-03-19 RX ORDER — SODIUM CHLORIDE 9 MG/ML
125 INJECTION, SOLUTION INTRAVENOUS CONTINUOUS
Status: DISPENSED | OUTPATIENT
Start: 2025-03-19 | End: 2025-03-19

## 2025-03-19 RX ADMIN — CLOPIDOGREL BISULFATE 600 MG: 75 TABLET ORAL at 09:01

## 2025-03-19 RX ADMIN — SODIUM CHLORIDE 125 ML/HR: 0.9 INJECTION, SOLUTION INTRAVENOUS at 08:56

## 2025-03-19 RX ADMIN — ASPIRIN 324 MG: 81 TABLET, CHEWABLE ORAL at 09:00

## 2025-03-19 NOTE — INTERVAL H&P NOTE
Update: (This section must be completed if the H&P was completed greater than 24 hrs to procedure or admission)    H&P reviewed. After examining the patient, I find no changed to the H&P since it had been written.      Dmitriy Valdivia, a 70 year old patient, presents to [facility] for an outpatient elective cardiac catheterization for evaluation of abnormal echo stress test showing hypokinetic area at the basal inferior segment.  He denies complaints of chest pain, shortness of breath or palpitations.         /64   Pulse 72   Temp 97.6 °F (36.4 °C) (Temporal)   Resp 20   Wt 87.1 kg (192 lb)   SpO2 96%   BMI 29.85 kg/m²      EKG:  NSR with 1 degree AV block     Patient re-evaluated. Accept as history and physical.    JACKY Smith/March 19, 2025/9:20 AM

## 2025-03-19 NOTE — DISCHARGE INSTRUCTIONS
Patient Education     Mediterranean diet   The Basics   Written by the doctors and editors at [facility]   What is a Mediterranean diet? -- A Mediterranean diet is a heart-healthy way of eating. It includes foods and cooking styles from many countries around the [place], like [place] and [place]. The exact foods included vary from place to place.  A Mediterranean diet involves eating a lot of fruits, vegetables, nuts, and whole grains. It uses olive oil instead of other fats. It also includes some fish, poultry, and dairy products, but not a lot of red meat.  Wine is often thought of as part of a Mediterranean diet. It is not needed, and you might choose not to include it. If you do drink alcohol, limit the amount to:   For females, no more than 1 drink a day   For males, no more than 2 drinks a day  What are the benefits of a Mediterranean diet? -- A Mediterranean diet can help you:   Improve your overall health, and help you lose weight   Lower your risk of stroke   Lower your risk of heart problems such as a heart attack   Manage your blood sugar if you have diabetes  What can I eat and drink on a Mediterranean diet? -- A Mediterranean diet is more of an eating pattern than a strict diet. Try to cover two-thirds of your plate with fresh fruits and vegetables. Some examples of foods that are often part of this pattern:   Grains - Whole grains like whole-grain bread and pasta, oats, couscous, brown rice, barley, and orzo.   Fruits - Many kinds and colors of fresh, frozen, dried, or canned fruits. Frozen or canned fruits with 100% fruit juice or water (without added sugar). Examples include apples, pears, berries, melons, bananas, plums, raisins, figs, and peaches.   Vegetables - Many kinds and colors of fresh, frozen, or canned vegetables. If canned, low sodium or salt free. If frozen, without added fat and sodium. Examples include avocados, peppers, tomatoes, spinach, kale, beans, carrots, peas, olives,  cucumbers, hummus, soybeans, lentils, and kidney beans.   Dairy - Low-fat milk, cheese, and other dairy products. Greek yogurt, kefir, and plant-based milk alternatives like soy milk.   Lean meats, poultry, seafood, and proteins - [place], tuna, cod, and other fish. Shrimp, clams, scallops, and mussels. White meat chicken and turkey, eggs, dried beans, lentils, and tofu. Nuts such as walnuts, almonds, pecans, hazelnuts, cashews, peanuts, and nut butters. Seeds such as pumpkin, sesame, flax, and sunflower seeds.   Fats, oils, and other foods - Foods with healthy fats found in fish, nuts, and avocados. Olive oil or vegetable oils such as canola oil. Use onions, garlic, spices, and herbs to season food.  What foods and drinks should I avoid or limit on a Mediterranean diet? -- A Mediterranean diet involves avoiding or limiting certain types of foods. Try to avoid foods with additives like artificial sweeteners. Avoid foods that are processed, refined, or preserved. These are often foods with a very long shelf life.   Grains to avoid - White bread, pasta, white rice, crackers, and biscuits.   Fruits to avoid - Fruits canned or frozen with extra sugar.   Vegetables to avoid - Commercially prepared potatoes and vegetable mixes, regular canned vegetables and juices, and vegetables frozen with sauce or pickled vegetables.   Dairy to avoid - Whole-fat dairy products like cheese, ice cream, whole milk, cream, and buttermilk.   Lean meats, poultry, seafood, and proteins to avoid - Red meat such as beef, pork, and lamb. Processed meats such as sausages, deli meats, salami, hot dogs, and dumont.   Fats, oils, and other foods to avoid - Butter, margarine, lard, gravies, sauces, and salad dressing. Cookies, cakes, candy, doughnuts, muffins, and other sweets.  All topics are updated as new evidence becomes available and our peer review process is complete.  This topic retrieved from [employer] on: Apr 04, 2024.  Topic 465893 Version  2.0  Release: 32.2.4 - C32.93  © 2024 UpToDate, Inc. and/or its affiliates. All rights reserved.  Consumer Information Use and Disclaimer   Disclaimer: This generalized information is a limited summary of diagnosis, treatment, and/or medication information. It is not meant to be comprehensive and should be used as a tool to help the user understand and/or assess potential diagnostic and treatment options. It does NOT include all information about conditions, treatments, medications, side effects, or risks that may apply to a specific patient. It is not intended to be medical advice or a substitute for the medical advice, diagnosis, or treatment of a health care provider based on the health care provider's examination and assessment of a patient's specific and unique circumstances. Patients must speak with a health care provider for complete information about their health, medical questions, and treatment options, including any risks or benefits regarding use of medications. This information does not endorse any treatments or medications as safe, effective, or approved for treating a specific patient. UpToDate, Inc. and its affiliates disclaim any warranty or liability relating to this information or the use thereof.The use of this information is governed by the Terms of Use, available at https://www.[employer].com/en/know/clinical-effectiveness-terms. 2024© UpToDate, Inc. and its affiliates and/or licensors. All rights reserved.  Copyright   © 2024 UpToDate, Inc. and/or its affiliates. All rights reserved.

## 2025-03-19 NOTE — DISCHARGE INSTR - AVS FIRST PAGE
1. Please see the post cardiac catheterization dishcarge instructions.   No heavy lifting, greater than 10 lbs. or strenuous  activity for 48 hrs.    2.Remove band aid tomorrow.  Shower and wash area- wrist gently with soap and water- beginning tomorrow. Rinse and pat dry.  Apply new water seal band aid.  Repeat this process for 5 days. No powders, creams lotions or antibiotic ointments  for 5 days.  No tub baths, hot tubs or swimming for 5 days.     3. Please call our office ([phone]) if you have any fever, redness, swelling, discharge from your wrist access site.    4.No driving for 1 day

## 2025-03-27 DIAGNOSIS — Z87.39 HISTORY OF GOUT: ICD-10-CM

## 2025-03-27 RX ORDER — ALLOPURINOL 100 MG/1
100 TABLET ORAL DAILY
Qty: 90 TABLET | Refills: 0 | Status: SHIPPED | OUTPATIENT
Start: 2025-03-27

## 2025-04-17 ENCOUNTER — RA CDI HCC (OUTPATIENT)
Dept: OTHER | Facility: HOSPITAL | Age: 70
End: 2025-04-17

## 2025-04-23 ENCOUNTER — OFFICE VISIT (OUTPATIENT)
Dept: FAMILY MEDICINE CLINIC | Facility: CLINIC | Age: 70
End: 2025-04-23
Payer: MEDICARE

## 2025-04-23 ENCOUNTER — PATIENT MESSAGE (OUTPATIENT)
Dept: FAMILY MEDICINE CLINIC | Facility: CLINIC | Age: 70
End: 2025-04-23

## 2025-04-23 VITALS
HEIGHT: 69 IN | DIASTOLIC BLOOD PRESSURE: 72 MMHG | SYSTOLIC BLOOD PRESSURE: 140 MMHG | BODY MASS INDEX: 30.21 KG/M2 | WEIGHT: 204 LBS | HEART RATE: 66 BPM | TEMPERATURE: 98.7 F | OXYGEN SATURATION: 96 %

## 2025-04-23 DIAGNOSIS — I25.10 CORONARY ARTERY DISEASE INVOLVING NATIVE CORONARY ARTERY OF NATIVE HEART WITHOUT ANGINA PECTORIS: Primary | ICD-10-CM

## 2025-04-23 DIAGNOSIS — I10 PRIMARY HYPERTENSION: ICD-10-CM

## 2025-04-23 DIAGNOSIS — E03.8 SUBCLINICAL HYPOTHYROIDISM: ICD-10-CM

## 2025-04-23 DIAGNOSIS — I77.810 THORACIC AORTIC ECTASIA (HCC): ICD-10-CM

## 2025-04-23 PROCEDURE — G2211 COMPLEX E/M VISIT ADD ON: HCPCS | Performed by: FAMILY MEDICINE

## 2025-04-23 PROCEDURE — 99214 OFFICE O/P EST MOD 30 MIN: CPT | Performed by: FAMILY MEDICINE

## 2025-04-23 NOTE — ASSESSMENT & PLAN NOTE
Has f/u CT scan ordered- sent reminder to schedule as I neglected to mention this at our visit.

## 2025-04-23 NOTE — PROGRESS NOTES
Name: Herbert Valdivia      : 1955      MRN: [id number]  Encounter Provider: Myranda Nathan MD  Encounter Date: 2025   Encounter department: [facility]  :  Assessment & Plan  Coronary artery disease involving native coronary artery of native heart without angina pectoris  Reviewed stress test and cardiac cath results with patient.  He is taking atorvastatin 40 mg daily and asa 81 mg daily  Advised to d/c red yeast rice since he is on atorvastatin- discussed risk of hepatotoxicity.  Check CMP and lipid panel 2 months after starting statin- lab order placed for next month.    He has resumed his usual physical activity and remains asymptomatic.     Reached out to cardiology this AM to find out when pt should have follow up- pt was advised he should schedule appointment with Dr Guevara for follow up to review results sooner rather than later.  He will call and do so.    Orders:  •  Comprehensive metabolic panel; Future  •  Lipid Panel with Direct LDL reflex; Future    Thoracic aortic ectasia (HCC)  Has f/u CT scan ordered- sent reminder to schedule as I neglected to mention this at our visit.       Subclinical hypothyroidism  Remains in subclinical range.  Reviewed.  Repeat in six months- ordered.       Primary hypertension  BP controlled on benazepril, notes always a bit higher in office but his home BP readings have been consistently well controlled since increase in benazepril to 10 mg daily.                History of Present Illness   HPI    Pt here for follow up visit after his [facility] PE.  At his PE, he was found to have abnormal stress test and was set up for cardiac catheterization.  Post-stress echo showed new wall motion changes and EKG showed inferior st depressions.    Had cardiac cath 3/19/25.  No stents placed during cath.  He was started on atorvastatin 40 mg daily and asa 81 at his [facility] PE visit and has continued on these.  Some SE noted with  "medication- at times feels muscle fatigue, but not consistently.  Tolerable.  He has been taking red yeast rice in the past- had been continuing on this- notes he was told it didn't matter if he continued on it.  (Note- I advised to please d/c red yeast rice and continue on atorvastatin)     He was concerned about the findings of the stress and cath and notes he wasn't sure if he could resume exercise or not after seeing his results.  Was given info on mediterranean diet by nurse after his catheterization which was helpful. Wasn't given any restrictions or recommendations on activity.  Ultimately resumed his usual physical activity/exercise since he has been asymptomatic.    He continues to feels well- no cp, sob, dizziness/lightheadedness, n/v.  He has always had some jaw pain with activity since he was a teen- assumes it may be from jaw clenching w/ exercise.  This hasn't seemed different than the past.    He had reached out in regard to his home BP readings 1 month ago- he had noted his blood pressure had increased from 120s/70s to 130s/80s.  He increased his ramipril from 5 mg to 10 mg daily and noted that his BP improved to previous levels- I agreed that he should continue 10 mg daily and sent new prescription for 10 mg dose. He reports this continues to be effective.  Has some white coat htn but notes blood pressure better at home than here.          Review of Systems    Objective   /72 (BP Location: Left arm, Patient Position: Sitting, Cuff Size: Standard)   Pulse 66   Temp 98.7 °F (37.1 °C) (Tympanic)   Ht 5' 8.5\" (1.74 m) Comment: verbal  Wt 92.5 kg (204 lb)   SpO2 96%   BMI 30.57 kg/m²      Physical Exam  Vitals and nursing note reviewed.   Constitutional:       Appearance: Normal appearance. He is not ill-appearing.   Neck:      Vascular: No carotid bruit.   Cardiovascular:      Rate and Rhythm: Regular rhythm.   Pulmonary:      Effort: Pulmonary effort is normal.      Breath sounds: Normal " breath sounds.   Musculoskeletal:      Right lower leg: No edema.      Left lower leg: No edema.   Lymphadenopathy:      Cervical: No cervical adenopathy.   Neurological:      Mental Status: He is alert and oriented to person, place, and time.      Gait: Gait normal.   Psychiatric:         Mood and Affect: Mood normal.         Behavior: Behavior normal.

## 2025-04-23 NOTE — ASSESSMENT & PLAN NOTE
Reviewed stress test and cardiac cath results with patient.  He is taking atorvastatin 40 mg daily and asa 81 mg daily  Advised to d/c red yeast rice since he is on atorvastatin- discussed risk of hepatotoxicity.  Check CMP and lipid panel 2 months after starting statin- lab order placed for next month.    He has resumed his usual physical activity and remains asymptomatic.     Reached out to cardiology this AM to find out when pt should have follow up- pt was advised he should schedule appointment with Dr Guevara for follow up to review results sooner rather than later.  He will call and do so.    Orders:  •  Comprehensive metabolic panel; Future  •  Lipid Panel with Direct LDL reflex; Future

## 2025-04-24 NOTE — ASSESSMENT & PLAN NOTE
BP controlled on benazepril, notes always a bit higher in office but his home BP readings have been consistently well controlled since increase in benazepril to 10 mg daily.

## 2025-05-01 ENCOUNTER — PATIENT MESSAGE (OUTPATIENT)
Dept: FAMILY MEDICINE CLINIC | Facility: CLINIC | Age: 70
End: 2025-05-01

## 2025-05-16 ENCOUNTER — TELEPHONE (OUTPATIENT)
Age: 70
End: 2025-05-16

## 2025-05-16 ENCOUNTER — OFFICE VISIT (OUTPATIENT)
Dept: CARDIOLOGY CLINIC | Facility: CLINIC | Age: 70
End: 2025-05-16
Payer: MEDICARE

## 2025-05-16 VITALS
DIASTOLIC BLOOD PRESSURE: 72 MMHG | BODY MASS INDEX: 29.9 KG/M2 | SYSTOLIC BLOOD PRESSURE: 158 MMHG | WEIGHT: 201.85 LBS | HEIGHT: 69 IN | HEART RATE: 77 BPM | OXYGEN SATURATION: 97 %

## 2025-05-16 DIAGNOSIS — I10 PRIMARY HYPERTENSION: ICD-10-CM

## 2025-05-16 DIAGNOSIS — I25.10 CORONARY ARTERY DISEASE INVOLVING NATIVE CORONARY ARTERY OF NATIVE HEART WITHOUT ANGINA PECTORIS: ICD-10-CM

## 2025-05-16 DIAGNOSIS — I77.810 THORACIC AORTIC ECTASIA (HCC): Primary | ICD-10-CM

## 2025-05-16 DIAGNOSIS — R73.03 PREDIABETES: ICD-10-CM

## 2025-05-16 DIAGNOSIS — R93.1 ELEVATED CORONARY ARTERY CALCIUM SCORE: ICD-10-CM

## 2025-05-16 PROCEDURE — 99204 OFFICE O/P NEW MOD 45 MIN: CPT | Performed by: INTERNAL MEDICINE

## 2025-05-16 NOTE — ASSESSMENT & PLAN NOTE
With stress echocardiogram in March 2025 revealing a very mild wall motion abnormality at peak stress in the basal portion of the inferior wall resulted in further evaluation cardiac catheterization  Coronary calcium score noted to be 2772 in April 2022, placing him at 97th percentile for age and gender matched individuals  Distribution of plaque included a score of 107 in the left main coronary artery, 1266 and the LAD, 511 in the left circumflex, 868 in the right coronary artery, and 20 in the posterior descending coronary artery  Continued on aspirin and statin

## 2025-05-16 NOTE — ASSESSMENT & PLAN NOTE
Chest in March 2024 revealed an ascending thoracic aorta measuring 4.2 cm, unchanged from prior study in April 2022  Continued on aspirin and statin  Continue serial evaluation with CT scan

## 2025-05-16 NOTE — TELEPHONE ENCOUNTER
I think it's more appropriate to have a face-to-face conversation regarding the results and go over further management rather than a phone conversation to be able to dedicate an appropriate amount of time for the discussion.  I offered to have him come in today at 2:40pm at [facility] since I don't need the admin time.  Monday (5/19) at [facility] at 9am, 9:20am, 10am, 10:40am, 11am, 11:20am, or 1:40pm are also options.

## 2025-05-16 NOTE — PROGRESS NOTES
Cardiology Consultation     Herbert Valdivia  [id number]  1955  [facility] [place]  [address]    [place] [id number]    Assessment & Plan  Thoracic aortic ectasia (HCC)  Chest in March 2024 revealed an ascending thoracic aorta measuring 4.2 cm, unchanged from prior study in April 2022  Continued on aspirin and statin  Continue serial evaluation with CT scan  Primary hypertension  Elevated today, usually better controlled on ramipril  Elevated coronary artery calcium score  With stress echocardiogram in March 2025 revealing a very mild wall motion abnormality at peak stress in the basal portion of the inferior wall resulted in further evaluation cardiac catheterization  Coronary calcium score noted to be 2772 in April 2022, placing him at 97th percentile for age and gender matched individuals  Distribution of plaque included a score of 107 in the left main coronary artery, 1266 and the LAD, 511 in the left circumflex, 868 in the right coronary artery, and 20 in the posterior descending coronary artery  Continued on aspirin and statin  Coronary artery disease involving native coronary artery of native heart without angina pectoris  Elevated coronary calcium score  Currently asymptomatic  Continued on aspirin and statin  With abnormal stress testing in March 2025, cardiac catheterization was performed revealing a 50% mid LAD lesion, 95% OM1 lesion, 70% right PDA lesion, and 100% first RPL lesion  Since the distribution of disease was predominantly in the distal vessels, and follows a pattern typical for with diabetes without adequate surgical targets for revascularization, and he does not have symptoms, PCI was also not performed  Beta-blocker was offered, however he continues to defer on this option  If symptoms develop, then PCI of the OM1 lesion can be considered  Prediabetes      Chief Complaint   Patient presents with    Follow-up     Discuss  "cath       HPI: Patient is here for cardiac catheterization results discussion.  Patient feels well, without complaints.  No reported chest pain, shortness of breath, palpitations, lightheadedness, syncope, LE edema, orthopnea, PND, or significant weight changes.  Patient remains active without any increased fatigue out of the ordinary.        Vitals:    05/16/25 1425   BP: 158/72   BP Location: Left arm   Patient Position: Sitting   Cuff Size: Standard   Pulse: 77   SpO2: 97%   Weight: 91.6 kg (201 lb 13.6 oz)   Height: 5' 8.5\" (1.74 m)       Review of Systems:  Review of Systems   Constitutional:  Negative for activity change, appetite change, fatigue and fever.   HENT:  Negative for nosebleeds and sore throat.    Eyes:  Negative for photophobia and visual disturbance.   Respiratory:  Negative for cough, chest tightness, shortness of breath and wheezing.    Cardiovascular:  Negative for chest pain, palpitations and leg swelling.   Gastrointestinal:  Negative for abdominal pain, diarrhea, nausea and vomiting.   Endocrine: Negative for polyuria.   Genitourinary:  Negative for dysuria, frequency and hematuria.   Musculoskeletal:  Negative for arthralgias, back pain and gait problem.   Skin:  Negative for pallor and rash.   Neurological:  Negative for dizziness, syncope, speech difficulty and light-headedness.   Hematological:  Does not bruise/bleed easily.   Psychiatric/Behavioral:  Negative for agitation, behavioral problems and confusion.        Physical Exam:  Physical Exam  Vitals reviewed.   Constitutional:       General: He is not in acute distress.     Appearance: Normal appearance. He is well-developed. He is not diaphoretic.   HENT:      Head: Normocephalic and atraumatic.      Nose: Nose normal.     Eyes:      General: No scleral icterus.     Extraocular Movements: Extraocular movements intact.      Pupils: Pupils are equal, round, and reactive to light.     Neck:      Vascular: No JVD.     Cardiovascular: "      Rate and Rhythm: Normal rate and regular rhythm.      Heart sounds: S1 normal and S2 normal. Heart sounds not distant. No murmur heard.     No systolic murmur is present.      No friction rub. No gallop. No S3 sounds.   Pulmonary:      Effort: Pulmonary effort is normal. No respiratory distress.      Breath sounds: Normal breath sounds. No wheezing or rales.   Abdominal:      General: Bowel sounds are normal. There is no distension.      Palpations: Abdomen is soft.     Musculoskeletal:         General: No deformity.      Cervical back: Normal range of motion and neck supple.      Right lower leg: No edema.      Left lower leg: No edema.     Skin:     General: Skin is warm and dry.      Findings: No erythema.     Neurological:      Mental Status: He is alert and oriented to person, place, and time.      Cranial Nerves: No cranial nerve deficit.     Psychiatric:         Mood and Affect: Mood normal.         Behavior: Behavior normal.         Problem List[1]  Past Medical History:   Diagnosis Date    Gout     Hypertension     Kidney stone     Scoliosis April 2022    Chiropractor     Social History     Socioeconomic History    Marital status: /Civil Union     Spouse name: Not on file    Number of children: Not on file    Years of education: Not on file    Highest education level: Not on file   Occupational History    Occupation:    Tobacco Use    Smoking status: Never     Passive exposure: Past    Smokeless tobacco: Never   Vaping Use    Vaping status: Never Used   Substance and Sexual Activity    Alcohol use: Yes     Alcohol/week: 3.0 standard drinks of alcohol     Types: 1 Glasses of wine, 1 Cans of beer, 1 Standard drinks or equivalent per week     Comment: Social    Drug use: Never    Sexual activity: Yes     Partners: Female   Other Topics Concern    Not on file   Social History Narrative    ** Merged History Encounter **          Social Drivers of Health     Financial Resource Strain:  Low Risk  (1/29/2024)    Overall Financial Resource Strain (CARDIA)     Difficulty of Paying Living Expenses: Not hard at all   Food Insecurity: Not on file   Transportation Needs: No Transportation Needs (1/29/2024)    PRAPARE - Transportation     Lack of Transportation (Medical): No     Lack of Transportation (Non-Medical): No   Physical Activity: Not on file   Stress: Not on file   Social Connections: Not on file   Intimate Partner Violence: Not on file   Housing Stability: Not on file      Family History   Problem Relation Age of Onset    Colon cancer Mother 78    Heart disease Father         cabg x 4 in his 50s, smoker    No Known Problems Sister     Skin cancer Brother         coretta lawrence age 80s    No Known Problems Daughter      Past Surgical History:   Procedure Laterality Date    CARDIAC CATHETERIZATION Left 3/19/2025    Procedure: Cardiac Left Heart Cath;  Surgeon: Celestine Valentine MD;  [facility];  Service: Cardiology    COLONOSCOPY  06/2015    diverticulosis only    COLONOSCOPY  03/2010    GERARD Caldera MD.- Diverticulosis.    COLONOSCOPY W/ BIOPSIES  12/2020    GERARD Maynard MD.- One 2mm polyp at recto-sigmoid colon; diverticulosis in sigmoid colon and distal descending colon. Bx: Hyperplastic polyp, microvesicular type; negative for dysplasia.    COLONOSCOPY W/ BIOPSIES  06/2004    GERARD Caldera MD.- Small left colon polyp, benign in appearance. Bx: hyperplastic polyp.    ROTATOR CUFF REPAIR Right 2007     Current Medications[2]  No Known Allergies    Labs:  Admission on 03/19/2025, Discharged on 03/19/2025   Component Date Value    Ventricular Rate 03/19/2025 79     Atrial Rate 03/19/2025 83     RI Interval 03/19/2025 278     QRSD Interval 03/19/2025 92     QT Interval 03/19/2025 360     QTC Interval 03/19/2025 413     P Axis 03/19/2025 57     QRS [place] 03/19/2025 -12     T Wave [place] 03/19/2025 40    Hospital Outpatient Visit on 03/12/2025   Component Date Value    Baseline HR  03/12/2025 61     Baseline BP 03/12/2025 110/78     O2 sat rest 03/12/2025 95     Stress peak HR 03/12/2025 127     Post peak BP 03/12/2025 178     O2 sat peak 03/12/2025 94     Recovery HR 03/12/2025 78     Recovery BP 03/12/2025 150/88     O2 sat recovery 03/12/2025 94     Max HR 03/12/2025 129     Max HR Percent 03/12/2025 86     Exercise duration (min) 03/12/2025 9     Exercise duration (sec) 03/12/2025 0     Estimated workload 03/12/2025 10.1     Rate Pressure Product 03/12/2025 22,606.0     Angina Index 03/12/2025 0     Stress Stage Reached 03/12/2025 3.0     [facility] Treadmill Score 03/12/2025 -1     LV EF 03/12/2025 60     ST Depression (mm) 03/12/2025 2.0     Post left ventricular EF 03/12/2025 70     Protocol Name 03/12/2025 SUSAN     Exercise duration (min) 03/12/2025 9     Exercise duration (sec) 03/12/2025 0     Post Peak Systolic BP 03/12/2025 180     Max Diastolic Bp 03/12/2025 98     Peak HR 03/12/2025 129     Max Predicted Heart Rate 03/12/2025 149     Reason for Termination 03/12/2025 MAXIMAL EXERCISE     Test Indication 03/12/2025 [facility]     Target Hr Formular 03/12/2025 (220 - Age)*100%     Chest Pain Statement 03/12/2025 none    Hospital Outpatient Visit on 03/12/2025   Component Date Value    Triscuspid Valve Regurgi* 03/12/2025 11.0     RAA A4C 03/12/2025 13.8     DIA A4C 03/12/2025 17.5     LA Volume Index (BP) 03/12/2025 27.4     MV Peak A [place] 03/12/2025 0.82     MV stenosis pressure 1/2* 03/12/2025 76     MV Peak E [place] 03/12/2025 63     E wave deceleration time 03/12/2025 262     E/A ratio 03/12/2025 0.77     MV valve area p 1/2 meth* 03/12/2025 2.90     RA 2D Volume 03/12/2025 24.0     TR Peak [place] 03/12/2025 1.7     RVID d 03/12/2025 3.8     A4C EF 03/12/2025 65     Tricuspid valve peak reg* 03/12/2025 1.68     Left ventricular stroke * 03/12/2025 53.00     IVSd 03/12/2025 1.10     Tricuspid annular plane * 03/12/2025 2.40     Trans arch prox 03/12/2025 3.93     Ao root 03/12/2025 3.40      LVPWd 03/12/2025 1.10     LA size 03/12/2025 4.5     Asc Ao 03/12/2025 4.1     LA volume (BP) 03/12/2025 57     FS 03/12/2025 32     LVIDS 03/12/2025 3.00     IVS 03/12/2025 1.1     LVIDd 03/12/2025 4.40     LA length (A2C) 03/12/2025 5.30     LEFT VENTRICLE SYSTOLIC * 03/12/2025 35     LV DIASTOLIC VOLUME (MOD* 03/12/2025 88     Left Atrium Area-systoli* 03/12/2025 14.6     Left Atrium Area-systoli* 03/12/2025 20.5     MV E' Tissue Velocity Se* 03/12/2025 9     LVSV, 2D 03/12/2025 53     BSA 03/12/2025 2.04     LV EF 03/12/2025 60    Lab on 03/12/2025   Component Date Value    Lipoprotein (a) 03/12/2025 9.8     WBC 03/12/2025 4.45     RBC 03/12/2025 5.66 (H)     Hemoglobin 03/12/2025 14.1     Hematocrit 03/12/2025 45.1     MCV 03/12/2025 80 (L)     MCH 03/12/2025 24.9 (L)     MCHC 03/12/2025 31.3 (L)     RDW 03/12/2025 16.6 (H)     MPV 03/12/2025 9.3     Platelets 03/12/2025 228     nRBC 03/12/2025 0     Segmented % 03/12/2025 55     Immature Grans % 03/12/2025 0     Lymphocytes % 03/12/2025 27     Monocytes % 03/12/2025 11     Eosinophils Relative 03/12/2025 6     Basophils Relative 03/12/2025 1     Absolute Neutrophils 03/12/2025 2.47     Absolute Immature Grans 03/12/2025 0.01     Absolute Lymphocytes 03/12/2025 1.19     Absolute Monocytes 03/12/2025 0.47     Eosinophils Absolute 03/12/2025 0.27     Basophils Absolute 03/12/2025 0.04     Sodium 03/12/2025 140     Potassium 03/12/2025 4.0     Chloride 03/12/2025 105     CO2 03/12/2025 29     ANION GAP 03/12/2025 6     BUN 03/12/2025 15     Creatinine 03/12/2025 1.14     Glucose, Fasting 03/12/2025 104 (H)     Calcium 03/12/2025 10.0     AST 03/12/2025 23     ALT 03/12/2025 18     Alkaline Phosphatase 03/12/2025 77     Total Protein 03/12/2025 7.6     Albumin 03/12/2025 4.7     Total Bilirubin 03/12/2025 0.67     eGFR 03/12/2025 64     Hemoglobin A1C 03/12/2025 6.0 (H)     EAG 03/12/2025 126     Hepatitis C Ab 03/12/2025 Non-reactive     CRP, High Sensitivity  03/12/2025 1.23     Cholesterol 03/12/2025 182     Triglycerides 03/12/2025 73     HDL, Direct 03/12/2025 56     LDL Calculated 03/12/2025 111 (H)     TSH 3RD GENERATON 03/12/2025 7.285 (H)     Color, UA 03/12/2025 Light Yellow     Clarity, UA 03/12/2025 Clear     Specific Gravity, UA 03/12/2025 1.018     pH, UA 03/12/2025 5.5     Leukocytes, UA 03/12/2025 Negative     Nitrite, UA 03/12/2025 Negative     Protein, UA 03/12/2025 Trace (A)     Glucose, UA 03/12/2025 Negative     Ketones, UA 03/12/2025 Negative     Urobilinogen, UA 03/12/2025 <2.0     Bilirubin, UA 03/12/2025 Negative     Occult Blood, UA 03/12/2025 Negative     RBC, UA 03/12/2025 None Seen     WBC, UA 03/12/2025 2-4 (A)     Epithelial Cells 03/12/2025 None Seen     Bacteria, UA 03/12/2025 None Seen     Vit D, 25-Hydroxy 03/12/2025 31.5     PSA, Diagnostic 03/12/2025 1.379     Free T4 03/12/2025 0.81     Ventricular Rate 03/12/2025 60     Atrial Rate 03/12/2025 60     KS Interval 03/12/2025 248     QRSD Interval 03/12/2025 92     QT Interval 03/12/2025 410     QTC Interval 03/12/2025 410     P Axis 03/12/2025 63     QRS [place] 03/12/2025 48     T Wave Axis 03/12/2025 18      Lab Results   Component Value Date    TRIG 73 03/12/2025    TRIG 75 02/21/2023    HDL 56 03/12/2025    HDL 58 02/21/2023     Imaging: No results found.         [1]   Patient Active Problem List  Diagnosis    Primary hypertension    Onychomycosis    History of gout    Family history of colon cancer    Abnormal blood sugar    Vitamin D deficiency    Bilateral carotid artery stenosis    Thoracic aortic ectasia (HCC)    Scoliosis of thoracolumbar spine    Elevated coronary artery calcium score    Leukopenia    Skin lesion of right lower extremity    Persistent proteinuria    Lung nodule    Subclinical hypothyroidism    Sacroiliac joint dysfunction of left side    Prediabetes    Coronary artery disease involving native coronary artery   [2]   Current Outpatient Medications:     allopurinol  (ZYLOPRIM) 100 mg tablet, TAKE 1 TABLET BY MOUTH EVERY DAY, Disp: 90 tablet, Rfl: 0    aspirin (ECOTRIN LOW STRENGTH) 81 mg EC tablet, Take 1 tablet (81 mg total) by mouth daily, Disp: 90 tablet, Rfl: 0    atorvastatin (LIPITOR) 40 mg tablet, Take 1 tablet (40 mg total) by mouth daily, Disp: 90 tablet, Rfl: 0    Fish Oil-Cholecalciferol (Omega-3 + D) 500-200 MG-UNIT CAPS, Take by mouth Res-q 1250, Disp: , Rfl:     Multiple Vitamin (multivitamin) tablet, Take 1 tablet by mouth in the morning., Disp: , Rfl:     ramipril (ALTACE) 10 MG capsule, Take 1 capsule (10 mg total) by mouth daily, Disp: 90 capsule, Rfl: 1

## 2025-05-16 NOTE — ASSESSMENT & PLAN NOTE
Elevated coronary calcium score  Currently asymptomatic  Continued on aspirin and statin  With abnormal stress testing in March 2025, cardiac catheterization was performed revealing a 50% mid LAD lesion, 95% OM1 lesion, 70% right PDA lesion, and 100% first RPL lesion  Since the distribution of disease was predominantly in the distal vessels, and follows a pattern typical for with diabetes without adequate surgical targets for revascularization, and he does not have symptoms, PCI was also not performed  Beta-blocker was offered, however he continues to defer on this option  If symptoms develop, then PCI of the OM1 lesion can be considered

## 2025-05-16 NOTE — TELEPHONE ENCOUNTER
Spoke with pt - he is coming in today at 240    Clerical- can we schedule pt for 240(admin time) with Dr. Guevara today . Thanks

## 2025-06-11 DIAGNOSIS — R93.1 ELEVATED CORONARY ARTERY CALCIUM SCORE: ICD-10-CM

## 2025-06-11 DIAGNOSIS — R94.39 POSITIVE CARDIAC STRESS TEST: ICD-10-CM

## 2025-06-11 RX ORDER — ATORVASTATIN CALCIUM 40 MG/1
40 TABLET, FILM COATED ORAL DAILY
Qty: 90 TABLET | Refills: 2 | Status: SHIPPED | OUTPATIENT
Start: 2025-06-11

## 2025-06-11 RX ORDER — ATORVASTATIN CALCIUM 40 MG/1
40 TABLET, FILM COATED ORAL DAILY
Qty: 90 TABLET | Refills: 0 | Status: CANCELLED | OUTPATIENT
Start: 2025-06-11

## 2025-06-11 RX ORDER — ASPIRIN 81 MG/1
81 TABLET ORAL DAILY
Qty: 90 TABLET | Refills: 0 | Status: CANCELLED | OUTPATIENT
Start: 2025-06-11

## 2025-06-11 RX ORDER — ASPIRIN 81 MG/1
81 TABLET ORAL DAILY
Qty: 90 TABLET | Refills: 2 | Status: SHIPPED | OUTPATIENT
Start: 2025-06-11

## 2025-06-11 NOTE — TELEPHONE ENCOUNTER
This refill was routed to our office.     Pt is requesting Atorvastatin 40 mg   And aspirin EC 81 mg.

## 2025-06-16 ENCOUNTER — HOSPITAL ENCOUNTER (EMERGENCY)
Facility: HOSPITAL | Age: 70
Discharge: HOME | End: 2025-06-16
Attending: EMERGENCY MEDICINE | Admitting: EMERGENCY MEDICINE
Payer: MEDICARE

## 2025-06-16 VITALS
SYSTOLIC BLOOD PRESSURE: 127 MMHG | OXYGEN SATURATION: 96 % | TEMPERATURE: 98.5 F | HEART RATE: 86 BPM | RESPIRATION RATE: 18 BRPM | DIASTOLIC BLOOD PRESSURE: 88 MMHG

## 2025-06-16 DIAGNOSIS — R68.89 SUSPECTED LYME DISEASE: Primary | ICD-10-CM

## 2025-06-16 DIAGNOSIS — R21 RASH: ICD-10-CM

## 2025-06-16 DIAGNOSIS — R53.83 FATIGUE: ICD-10-CM

## 2025-06-16 LAB
ANION GAP SERPL CALCULATED.3IONS-SCNC: 6 MMOL/L (ref 4–13)
ATRIAL RATE: 91 BPM
BASOPHILS # BLD AUTO: 0.02 THOUSANDS/ÂΜL (ref 0–0.1)
BASOPHILS NFR BLD AUTO: 0 % (ref 0–1)
BUN SERPL-MCNC: 17 MG/DL (ref 5–25)
CALCIUM SERPL-MCNC: 9.8 MG/DL (ref 8.4–10.2)
CHLORIDE SERPL-SCNC: 104 MMOL/L (ref 96–108)
CO2 SERPL-SCNC: 29 MMOL/L (ref 21–32)
CREAT SERPL-MCNC: 1.03 MG/DL (ref 0.6–1.3)
EOSINOPHIL # BLD AUTO: 0.11 THOUSAND/ÂΜL (ref 0–0.61)
EOSINOPHIL NFR BLD AUTO: 2 % (ref 0–6)
ERYTHROCYTE [DISTWIDTH] IN BLOOD BY AUTOMATED COUNT: 15.7 % (ref 11.6–15.1)
GFR SERPL CREATININE-BSD FRML MDRD: 72 ML/MIN/1.73SQ M
GLUCOSE SERPL-MCNC: 112 MG/DL (ref 65–140)
HCT VFR BLD AUTO: 41 % (ref 36.5–49.3)
HGB BLD-MCNC: 13.4 G/DL (ref 12–17)
IMM GRANULOCYTES # BLD AUTO: 0.04 THOUSAND/UL (ref 0–0.2)
IMM GRANULOCYTES NFR BLD AUTO: 1 % (ref 0–2)
LYMPHOCYTES # BLD AUTO: 0.82 THOUSANDS/ÂΜL (ref 0.6–4.47)
LYMPHOCYTES NFR BLD AUTO: 13 % (ref 14–44)
MCH RBC QN AUTO: 24.9 PG (ref 26.8–34.3)
MCHC RBC AUTO-ENTMCNC: 32.7 G/DL (ref 31.4–37.4)
MCV RBC AUTO: 76 FL (ref 82–98)
MONOCYTES # BLD AUTO: 0.52 THOUSAND/ÂΜL (ref 0.17–1.22)
MONOCYTES NFR BLD AUTO: 8 % (ref 4–12)
NEUTROPHILS # BLD AUTO: 5.07 THOUSANDS/ÂΜL (ref 1.85–7.62)
NEUTS SEG NFR BLD AUTO: 76 % (ref 43–75)
NRBC BLD AUTO-RTO: 0 /100 WBCS
P AXIS: 44 DEGREES
PLATELET # BLD AUTO: 327 THOUSANDS/UL (ref 149–390)
PMV BLD AUTO: 8.4 FL (ref 8.9–12.7)
POTASSIUM SERPL-SCNC: 4.3 MMOL/L (ref 3.5–5.3)
PR INTERVAL: 304 MS
QRS AXIS: -6 DEGREES
QRSD INTERVAL: 84 MS
QT INTERVAL: 352 MS
QTC INTERVAL: 432 MS
RBC # BLD AUTO: 5.38 MILLION/UL (ref 3.88–5.62)
SODIUM SERPL-SCNC: 139 MMOL/L (ref 135–147)
T WAVE AXIS: 12 DEGREES
VENTRICULAR RATE: 91 BPM
WBC # BLD AUTO: 6.58 THOUSAND/UL (ref 4.31–10.16)

## 2025-06-16 PROCEDURE — 86617 LYME DISEASE ANTIBODY: CPT | Performed by: EMERGENCY MEDICINE

## 2025-06-16 PROCEDURE — 99284 EMERGENCY DEPT VISIT MOD MDM: CPT | Performed by: EMERGENCY MEDICINE

## 2025-06-16 PROCEDURE — 80048 BASIC METABOLIC PNL TOTAL CA: CPT | Performed by: EMERGENCY MEDICINE

## 2025-06-16 PROCEDURE — 85025 COMPLETE CBC W/AUTO DIFF WBC: CPT | Performed by: EMERGENCY MEDICINE

## 2025-06-16 PROCEDURE — 93010 ELECTROCARDIOGRAM REPORT: CPT | Performed by: INTERNAL MEDICINE

## 2025-06-16 PROCEDURE — 36415 COLL VENOUS BLD VENIPUNCTURE: CPT | Performed by: EMERGENCY MEDICINE

## 2025-06-16 PROCEDURE — 93005 ELECTROCARDIOGRAM TRACING: CPT

## 2025-06-16 PROCEDURE — 86618 LYME DISEASE ANTIBODY: CPT | Performed by: EMERGENCY MEDICINE

## 2025-06-16 PROCEDURE — 99283 EMERGENCY DEPT VISIT LOW MDM: CPT

## 2025-06-16 RX ORDER — DOXYCYCLINE 100 MG/1
100 CAPSULE ORAL 2 TIMES DAILY
Qty: 28 CAPSULE | Refills: 0 | Status: SHIPPED | OUTPATIENT
Start: 2025-06-16 | End: 2025-06-30

## 2025-06-16 RX ORDER — DOXYCYCLINE 100 MG/1
100 CAPSULE ORAL 2 TIMES DAILY
Qty: 28 CAPSULE | Refills: 0 | Status: SHIPPED | OUTPATIENT
Start: 2025-06-16 | End: 2025-06-16

## 2025-06-16 RX ORDER — DOXYCYCLINE 100 MG/1
100 CAPSULE ORAL ONCE
Status: COMPLETED | OUTPATIENT
Start: 2025-06-16 | End: 2025-06-16

## 2025-06-16 RX ADMIN — DOXYCYCLINE 100 MG: 100 CAPSULE ORAL at 11:44

## 2025-06-16 NOTE — ED PROVIDER NOTES
Time reflects when diagnosis was documented in both MDM as applicable and the Disposition within this note       Time User Action Codes Description Comment    6/16/2025 12:05 PM Jose Cruz Castrejon Add [R21] Rash     6/16/2025 12:05 PM Jose Cruz Castrejon Add [R53.83] Fatigue     6/16/2025 12:05 PM Jose Cruz Castrejon Add [R68.89] Suspected Lyme disease     6/16/2025 12:05 PM Jose Cruz Castrejon Modify [R21] Rash     6/16/2025 12:05 PM Jose Cruz Castrejon Modify [R68.89] Suspected Lyme disease           ED Disposition       ED Disposition   Discharge    Condition   Stable    Date/Time   Mon Jun 16, 2025 12:04 PM    Comment   Herbert Valdivia discharge to home/self care.                   Assessment & Plan       Medical Decision Making  70-year-old male presenting to the emergency department with symptom course and current lesions suspicious for Lyme disease.  CBC, CMP without acute pathology.  Lyme Ig sent.  Patient states he has no known history of Lyme disease so if positive would be a new infection.  Doxycycline provided here and 14-day course of the same.  See ED course for further details as pertains to dosing choice.  No signs of neuro nor overt cardiac limb at this time.  Patient understands he is to follow-up with his primary care doctor as a pertains to the testing and evaluation however his symptoms on doxycycline. Reviewed all findings both relevant and incidental with the patient at bedside. Pt verbalized understanding of findings, neccesary follow up, return to ED precautions. Pt agreed to review today's findings with their primary care provider. Pt non-toxic appearing upon discharge.       Amount and/or Complexity of Data Reviewed  External Data Reviewed: notes.  Labs: ordered.  ECG/medicine tests: ordered and independent interpretation performed.     Details: EKG first-degree AVB similar to prior    Risk  Prescription drug management.        ED Course as of 06/16/25 1753   Mon Jun 16, 2025   1201 EKG first degree AVB and similar to  prior.    1207 As per EMRA recommendations, 14 day course doxy provided given 1st deg AVB for mild cardiac sequelae although much more likely stable from prior given hx prolonged ME.        Medications   doxycycline hyclate (VIBRAMYCIN) capsule 100 mg (100 mg Oral Given 6/16/25 1144)       ED Risk Strat Scores                    No data recorded                            History of Present Illness       Chief Complaint   Patient presents with    Rash     Circular rash all over body. Had chills/body aches last week then noticed the rash a few days later  States only one spot in groin itches. Not painful no discharge coming from them       Past Medical History[1]   Past Surgical History[2]   Family History[3]   Social History[4]   E-Cigarette/Vaping    E-Cigarette Use Never User       E-Cigarette/Vaping Substances    Nicotine No     THC No     CBD No     Flavoring No     Other No     Unknown No       I have reviewed and agree with the history as documented.     70-year-old male, past medical history per chart, presenting to the emergency department with generalized fatigue, nausea without vomiting, and thereafter began with circular targetoid lesions as present in the media tab.  Patient denies pruritus.  Notes that [place] and is out in the brush often.  Denies headache, change in vision, neck pain, back pain, chest pain, shortness of breath.  Notes the fatigue and nausea have resolved.  Only rash remains.      Rash  Associated symptoms: fatigue    Associated symptoms: no abdominal pain, no fever, no joint pain, no shortness of breath, no sore throat and not vomiting        Review of Systems   Constitutional:  Positive for fatigue. Negative for chills and fever.   HENT:  Negative for ear pain and sore throat.    Eyes:  Negative for pain and visual disturbance.   Respiratory:  Negative for cough and shortness of breath.    Cardiovascular:  Negative for chest pain and palpitations.    Gastrointestinal:  Negative for abdominal pain and vomiting.   Genitourinary:  Negative for dysuria and hematuria.   Musculoskeletal:  Negative for arthralgias and back pain.   Skin:  Positive for rash. Negative for color change.   Neurological:  Negative for seizures and syncope.   All other systems reviewed and are negative.          Objective       ED Triage Vitals [06/16/25 1034]   Temperature Pulse Blood Pressure Respirations SpO2 Patient Position - Orthostatic VS   98.5 °F (36.9 °C) 94 139/82 16 97 % Sitting      Temp Source Heart Rate Source BP Location FiO2 (%) Pain Score    Oral Monitor Right arm -- --      Vitals      Date and Time Temp Pulse SpO2 Resp BP Pain Score FACES Pain Rating User   06/16/25 1210 -- 86 96 % 18 127/88 -- -- DO   06/16/25 1034 98.5 °F (36.9 °C) 94 97 % 16 139/82 -- -- TP                      Physical Exam  Vitals and nursing note reviewed.   Constitutional:       General: He is not in acute distress.     Appearance: He is well-developed.   HENT:      Head: Normocephalic and atraumatic.     Eyes:      Conjunctiva/sclera: Conjunctivae normal.       Cardiovascular:      Rate and Rhythm: Normal rate and regular rhythm.      Heart sounds: No murmur heard.  Pulmonary:      Effort: Pulmonary effort is normal. No respiratory distress.      Breath sounds: Normal breath sounds.   Abdominal:      Palpations: Abdomen is soft.      Tenderness: There is no abdominal tenderness.     Musculoskeletal:         General: No swelling.      Cervical back: Neck supple.     Skin:     General: Skin is warm and dry.      Capillary Refill: Capillary refill takes less than 2 seconds.      Comments: Targetoid lesions, circular, well-defined in nature.  Palms soles and intraoral cavity spared.  See media tab for details.     Neurological:      Mental Status: He is alert.     Psychiatric:         Mood and Affect: Mood normal.         Results Reviewed       Procedure Component Value Units Date/Time    Basic  metabolic panel [[id number]] Collected: 06/16/25 1150    Lab Status: Final result Specimen: Blood from Arm, Right Updated: 06/16/25 1217     Sodium 139 mmol/L      Potassium 4.3 mmol/L      Chloride 104 mmol/L      CO2 29 mmol/L      ANION GAP 6 mmol/L      BUN 17 mg/dL      Creatinine 1.03 mg/dL      Glucose 112 mg/dL      Calcium 9.8 mg/dL      eGFR 72 ml/min/1.73sq m     Narrative:      [employer] guidelines for Chronic Kidney Disease (CKD):     Stage 1 with normal or high GFR (GFR > 90 mL/min/1.73 square meters)    Stage 2 Mild CKD (GFR = 60-89 mL/min/1.73 square meters)    Stage 3A Moderate CKD (GFR = 45-59 mL/min/1.73 square meters)    Stage 3B Moderate CKD (GFR = 30-44 mL/min/1.73 square meters)    Stage 4 Severe CKD (GFR = 15-29 mL/min/1.73 square meters)    Stage 5 End Stage CKD (GFR <15 mL/min/1.73 square meters)  Note: GFR calculation is accurate only with a steady state creatinine    CBC and differential [[id number]]  (Abnormal) Collected: 06/16/25 1150    Lab Status: Final result Specimen: Blood from Arm, Right Updated: 06/16/25 1201     WBC 6.58 Thousand/uL      RBC 5.38 Million/uL      Hemoglobin 13.4 g/dL      Hematocrit 41.0 %      MCV 76 fL      MCH 24.9 pg      MCHC 32.7 g/dL      RDW 15.7 %      MPV 8.4 fL      Platelets 327 Thousands/uL      nRBC 0 /100 WBCs      Segmented % 76 %      Immature Grans % 1 %      Lymphocytes % 13 %      Monocytes % 8 %      Eosinophils Relative 2 %      Basophils Relative 0 %      Absolute Neutrophils 5.07 Thousands/µL      Absolute Immature Grans 0.04 Thousand/uL      Absolute Lymphocytes 0.82 Thousands/µL      Absolute Monocytes 0.52 Thousand/µL      Eosinophils Absolute 0.11 Thousand/µL      Basophils Absolute 0.02 Thousands/µL     LYME TOTAL AB W REFLEX TO IGM/IGG [[id number]] Collected: 06/16/25 1150    Lab Status: In process Specimen: Blood from Arm, Right Updated: 06/16/25 1154    Narrative:      The following orders were created for  panel order LYME TOTAL AB W REFLEX TO IGM/IGG.  Procedure                               Abnormality         Status                     ---------                               -----------         ------                     Lyme Total AB W Reflex t...[[id number]]                      In process                   Please view results for these tests on the individual orders.    Lyme Total AB W Reflex to IGM/IGG [[id number]] Collected: 06/16/25 1150    Lab Status: In process Specimen: Blood from Arm, Right Updated: 06/16/25 1154            No orders to display       Procedures    ED Medication and Procedure Management   Prior to Admission Medications   Prescriptions Last Dose Informant Patient Reported? Taking?   Fish Oil-Cholecalciferol (Omega-3 + D) 500-200 MG-UNIT CAPS  Self Yes No   Sig: Take by mouth Res-q 1250   Multiple Vitamin (multivitamin) tablet  Self Yes No   Sig: Take 1 tablet by mouth in the morning.   allopurinol (ZYLOPRIM) 100 mg tablet   No No   Sig: TAKE 1 TABLET BY MOUTH EVERY DAY   aspirin (ECOTRIN LOW STRENGTH) 81 mg EC tablet   No No   Sig: Take 1 tablet (81 mg total) by mouth daily   atorvastatin (LIPITOR) 40 mg tablet   No No   Sig: Take 1 tablet (40 mg total) by mouth daily   ramipril (ALTACE) 10 MG capsule   No No   Sig: Take 1 capsule (10 mg total) by mouth daily      Facility-Administered Medications: None     Discharge Medication List as of 6/16/2025 12:08 PM        START taking these medications    Details   doxycycline monohydrate (MONODOX) 100 mg capsule Take 1 capsule (100 mg total) by mouth 2 (two) times a day for 14 days, Starting Mon 6/16/2025, Until Mon 6/30/2025, Print           CONTINUE these medications which have NOT CHANGED    Details   allopurinol (ZYLOPRIM) 100 mg tablet TAKE 1 TABLET BY MOUTH EVERY DAY, Starting Thu 3/27/2025, Normal      aspirin (ECOTRIN LOW STRENGTH) 81 mg EC tablet Take 1 tablet (81 mg total) by mouth daily, Starting Wed 6/11/2025, Normal      atorvastatin  (LIPITOR) 40 mg tablet Take 1 tablet (40 mg total) by mouth daily, Starting Wed 6/11/2025, Normal      Fish Oil-Cholecalciferol (Omega-3 + D) 500-200 MG-UNIT CAPS Take by mouth Res-q 1250, Historical Med      Multiple Vitamin (multivitamin) tablet Take 1 tablet by mouth in the morning., Historical Med      ramipril (ALTACE) 10 MG capsule Take 1 capsule (10 mg total) by mouth daily, Starting Tue 3/18/2025, Normal           No discharge procedures on file.  ED SEPSIS DOCUMENTATION   Time reflects when diagnosis was documented in both MDM as applicable and the Disposition within this note       Time User Action Codes Description Comment    6/16/2025 12:05 PM Jose Cruz Castrejon [R21] Rash     6/16/2025 12:05 PM Jose Cruz Castrejon [R53.83] Fatigue     6/16/2025 12:05 PM Jose Cruz Castrejon [R68.89] Suspected Lyme disease     6/16/2025 12:05 PM Jose Cruz Castrejon [R21] Rash     6/16/2025 12:05 PM Jose Cruz Castrejon [R68.89] Suspected Lyme disease                    [1]   Past Medical History:  Diagnosis Date    Gout     Hypertension     Kidney stone     Scoliosis April 2022    Chiropractor   [2]   Past Surgical History:  Procedure Laterality Date    CARDIAC CATHETERIZATION Left 3/19/2025    Procedure: Cardiac Left Heart Cath;  Surgeon: Celestine Valentine MD;  Location: [facility];  Service: Cardiology    COLONOSCOPY  06/2015    diverticulosis only    COLONOSCOPY  03/2010    GERARD Caldera MD.- Diverticulosis.    COLONOSCOPY W/ BIOPSIES  12/2020    GERARD Maynard MD.- One 2mm polyp at recto-sigmoid colon; diverticulosis in sigmoid colon and distal descending colon. Bx: Hyperplastic polyp, microvesicular type; negative for dysplasia.    COLONOSCOPY W/ BIOPSIES  06/2004    GERARD Caldera MD.- Small left colon polyp, benign in appearance. Bx: hyperplastic polyp.    ROTATOR CUFF REPAIR Right 2007   [3]   Family History  Problem Relation Name Age of Onset    Colon cancer Mother June 78    Heart disease Father Elan          cabg x 4 in his 50s, smoker    No Known Problems Sister      Skin cancer Brother          coretta [place]rence age 80s    No Known Problems Daughter     [4]   Social History  Tobacco Use    Smoking status: Never     Passive exposure: Past    Smokeless tobacco: Never   Vaping Use    Vaping status: Never Used   Substance Use Topics    Alcohol use: Yes     Alcohol/week: 3.0 standard drinks of alcohol     Types: 1 Glasses of wine, 1 Cans of beer, 1 Standard drinks or equivalent per week     Comment: Social    Drug use: Never        Jose Cruz Castrejon DO  06/16/25 9365

## 2025-06-17 ENCOUNTER — RESULTS FOLLOW-UP (OUTPATIENT)
Dept: EMERGENCY DEPT | Facility: HOSPITAL | Age: 70
End: 2025-06-17

## 2025-06-17 LAB
B BURGDOR IGG SERPL QL IA: POSITIVE
B BURGDOR IGG+IGM SER QL IA: POSITIVE
B BURGDOR IGM SERPL QL IA: POSITIVE

## 2025-06-23 DIAGNOSIS — R93.1 ELEVATED CORONARY ARTERY CALCIUM SCORE: ICD-10-CM

## 2025-06-23 DIAGNOSIS — R94.39 POSITIVE CARDIAC STRESS TEST: ICD-10-CM

## 2025-06-23 RX ORDER — ATORVASTATIN CALCIUM 40 MG/1
40 TABLET, FILM COATED ORAL DAILY
Qty: 90 TABLET | Refills: 0 | Status: CANCELLED | OUTPATIENT
Start: 2025-06-23

## 2025-06-27 DIAGNOSIS — Z87.39 HISTORY OF GOUT: ICD-10-CM

## 2025-06-27 RX ORDER — ALLOPURINOL 100 MG/1
100 TABLET ORAL DAILY
Qty: 90 TABLET | Refills: 1 | Status: SHIPPED | OUTPATIENT
Start: 2025-06-27

## 2025-08-14 ENCOUNTER — APPOINTMENT (OUTPATIENT)
Dept: LAB | Facility: CLINIC | Age: 70
End: 2025-08-14
Attending: FAMILY MEDICINE
Payer: MEDICARE

## 2025-08-14 LAB
ALBUMIN SERPL BCG-MCNC: 4.8 G/DL (ref 3.5–5)
ALP SERPL-CCNC: 72 U/L (ref 34–104)
ALT SERPL W P-5'-P-CCNC: 20 U/L (ref 7–52)
ANION GAP SERPL CALCULATED.3IONS-SCNC: 5 MMOL/L (ref 4–13)
AST SERPL W P-5'-P-CCNC: 22 U/L (ref 13–39)
BILIRUB SERPL-MCNC: 0.6 MG/DL (ref 0.2–1)
BUN SERPL-MCNC: 18 MG/DL (ref 5–25)
CALCIUM SERPL-MCNC: 9.6 MG/DL (ref 8.4–10.2)
CHLORIDE SERPL-SCNC: 108 MMOL/L (ref 96–108)
CHOLEST SERPL-MCNC: 116 MG/DL (ref ?–200)
CO2 SERPL-SCNC: 30 MMOL/L (ref 21–32)
CREAT SERPL-MCNC: 0.97 MG/DL (ref 0.6–1.3)
GFR SERPL CREATININE-BSD FRML MDRD: 78 ML/MIN/1.73SQ M
GLUCOSE P FAST SERPL-MCNC: 92 MG/DL (ref 65–99)
HDLC SERPL-MCNC: 57 MG/DL
LDLC SERPL CALC-MCNC: 50 MG/DL (ref 0–100)
POTASSIUM SERPL-SCNC: 4.3 MMOL/L (ref 3.5–5.3)
PROT SERPL-MCNC: 7.3 G/DL (ref 6.4–8.4)
SODIUM SERPL-SCNC: 143 MMOL/L (ref 135–147)
TRIGL SERPL-MCNC: 47 MG/DL (ref ?–150)

## 2025-08-19 ENCOUNTER — OFFICE VISIT (OUTPATIENT)
Dept: FAMILY MEDICINE CLINIC | Facility: CLINIC | Age: 70
End: 2025-08-19
Payer: MEDICARE

## 2025-08-19 VITALS
HEART RATE: 62 BPM | WEIGHT: 198 LBS | HEIGHT: 69 IN | OXYGEN SATURATION: 96 % | TEMPERATURE: 97.6 F | SYSTOLIC BLOOD PRESSURE: 128 MMHG | RESPIRATION RATE: 16 BRPM | DIASTOLIC BLOOD PRESSURE: 82 MMHG | BODY MASS INDEX: 29.33 KG/M2

## 2025-08-19 DIAGNOSIS — E03.8 SUBCLINICAL HYPOTHYROIDISM: ICD-10-CM

## 2025-08-19 DIAGNOSIS — I25.10 CORONARY ARTERY DISEASE INVOLVING NATIVE CORONARY ARTERY OF NATIVE HEART WITHOUT ANGINA PECTORIS: ICD-10-CM

## 2025-08-19 DIAGNOSIS — Z00.00 MEDICARE ANNUAL WELLNESS VISIT, SUBSEQUENT: Primary | ICD-10-CM

## 2025-08-19 DIAGNOSIS — I10 PRIMARY HYPERTENSION: ICD-10-CM

## 2025-08-19 DIAGNOSIS — Z12.5 SCREENING FOR PROSTATE CANCER: ICD-10-CM

## 2025-08-19 DIAGNOSIS — R91.1 LUNG NODULE: ICD-10-CM

## 2025-08-19 PROCEDURE — G0439 PPPS, SUBSEQ VISIT: HCPCS | Performed by: FAMILY MEDICINE

## (undated) DEVICE — GLIDESHEATH BASIC HYDROPHILIC COATED INTRODUCER SHEATH: Brand: GLIDESHEATH

## (undated) DEVICE — GUIDEWIRE WHOLEY HI TORQUE INTERM MOD J .035 145CM

## (undated) DEVICE — TR BAND RADIAL ARTERY COMPRESSION DEVICE: Brand: TR BAND

## (undated) DEVICE — DGW .035 FC J3MM 260CM TEF: Brand: EMERALD

## (undated) DEVICE — RADIFOCUS OPTITORQUE ANGIOGRAPHIC CATHETER: Brand: OPTITORQUE